# Patient Record
Sex: FEMALE | Race: OTHER | Employment: UNEMPLOYED | URBAN - METROPOLITAN AREA
[De-identification: names, ages, dates, MRNs, and addresses within clinical notes are randomized per-mention and may not be internally consistent; named-entity substitution may affect disease eponyms.]

---

## 2017-01-15 ENCOUNTER — APPOINTMENT (OUTPATIENT)
Dept: GENERAL RADIOLOGY | Age: 47
End: 2017-01-15
Attending: EMERGENCY MEDICINE
Payer: COMMERCIAL

## 2017-01-15 ENCOUNTER — HOSPITAL ENCOUNTER (EMERGENCY)
Age: 47
Discharge: HOME | End: 2017-01-15
Attending: EMERGENCY MEDICINE
Payer: COMMERCIAL

## 2017-01-15 VITALS
SYSTOLIC BLOOD PRESSURE: 140 MMHG | TEMPERATURE: 98.3 F | RESPIRATION RATE: 16 BRPM | HEIGHT: 62 IN | OXYGEN SATURATION: 100 % | HEART RATE: 90 BPM | BODY MASS INDEX: 32.39 KG/M2 | WEIGHT: 176 LBS | DIASTOLIC BLOOD PRESSURE: 87 MMHG

## 2017-01-15 DIAGNOSIS — K29.00 ACUTE GASTRITIS WITHOUT HEMORRHAGE, UNSPECIFIED GASTRITIS TYPE: Primary | ICD-10-CM

## 2017-01-15 LAB
ALBUMIN SERPL BCP-MCNC: 4.1 G/DL (ref 3.5–5)
ALBUMIN/GLOB SERPL: 1.1 {RATIO} (ref 1.2–3.5)
ALP SERPL-CCNC: 99 U/L (ref 50–136)
ALT SERPL-CCNC: 52 U/L (ref 12–65)
ANION GAP BLD CALC-SCNC: 7 MMOL/L (ref 7–16)
AST SERPL W P-5'-P-CCNC: 43 U/L (ref 15–37)
BASOPHILS # BLD AUTO: 0 K/UL (ref 0–0.2)
BASOPHILS # BLD: 0 % (ref 0–2)
BILIRUB SERPL-MCNC: 0.4 MG/DL (ref 0.2–1.1)
BUN SERPL-MCNC: 10 MG/DL (ref 6–23)
CALCIUM SERPL-MCNC: 9 MG/DL (ref 8.3–10.4)
CHLORIDE SERPL-SCNC: 107 MMOL/L (ref 98–107)
CO2 SERPL-SCNC: 28 MMOL/L (ref 21–32)
CREAT SERPL-MCNC: 0.67 MG/DL (ref 0.6–1)
CRP SERPL-MCNC: <0.3 MG/DL (ref 0–0.9)
D DIMER PPP FEU-MCNC: 0.23 UG/ML(FEU)
DIFFERENTIAL METHOD BLD: ABNORMAL
EOSINOPHIL # BLD: 0.1 K/UL (ref 0–0.8)
EOSINOPHIL NFR BLD: 2 % (ref 0.5–7.8)
ERYTHROCYTE [DISTWIDTH] IN BLOOD BY AUTOMATED COUNT: 13.3 % (ref 11.9–14.6)
GLOBULIN SER CALC-MCNC: 3.7 G/DL (ref 2.3–3.5)
GLUCOSE SERPL-MCNC: 81 MG/DL (ref 65–100)
HCT VFR BLD AUTO: 44.9 % (ref 35.8–46.3)
HGB BLD-MCNC: 15.3 G/DL (ref 11.7–15.4)
IMM GRANULOCYTES # BLD: 0 K/UL (ref 0–0.5)
IMM GRANULOCYTES NFR BLD AUTO: 0 % (ref 0–5)
LIPASE SERPL-CCNC: 212 U/L (ref 73–393)
LYMPHOCYTES # BLD AUTO: 46 % (ref 13–44)
LYMPHOCYTES # BLD: 3.1 K/UL (ref 0.5–4.6)
MCH RBC QN AUTO: 30.5 PG (ref 26.1–32.9)
MCHC RBC AUTO-ENTMCNC: 34.1 G/DL (ref 31.4–35)
MCV RBC AUTO: 89.4 FL (ref 79.6–97.8)
MONOCYTES # BLD: 0.4 K/UL (ref 0.1–1.3)
MONOCYTES NFR BLD AUTO: 6 % (ref 4–12)
NEUTS SEG # BLD: 3.2 K/UL (ref 1.7–8.2)
NEUTS SEG NFR BLD AUTO: 46 % (ref 43–78)
PLATELET # BLD AUTO: 207 K/UL (ref 150–450)
PLATELET COMMENTS,PCOM: ADEQUATE
PMV BLD AUTO: 13.6 FL (ref 10.8–14.1)
POTASSIUM SERPL-SCNC: 3.9 MMOL/L (ref 3.5–5.1)
PROT SERPL-MCNC: 7.8 G/DL (ref 6.3–8.2)
RBC # BLD AUTO: 5.02 M/UL (ref 4.05–5.25)
RBC MORPH BLD: ABNORMAL
SODIUM SERPL-SCNC: 142 MMOL/L (ref 136–145)
WBC # BLD AUTO: 6.8 K/UL (ref 4.3–11.1)
WBC MORPH BLD: ABNORMAL

## 2017-01-15 PROCEDURE — 99284 EMERGENCY DEPT VISIT MOD MDM: CPT | Performed by: EMERGENCY MEDICINE

## 2017-01-15 PROCEDURE — 81003 URINALYSIS AUTO W/O SCOPE: CPT | Performed by: EMERGENCY MEDICINE

## 2017-01-15 PROCEDURE — 80053 COMPREHEN METABOLIC PANEL: CPT | Performed by: EMERGENCY MEDICINE

## 2017-01-15 PROCEDURE — 85025 COMPLETE CBC W/AUTO DIFF WBC: CPT | Performed by: EMERGENCY MEDICINE

## 2017-01-15 PROCEDURE — 85379 FIBRIN DEGRADATION QUANT: CPT | Performed by: EMERGENCY MEDICINE

## 2017-01-15 PROCEDURE — 74011250637 HC RX REV CODE- 250/637: Performed by: EMERGENCY MEDICINE

## 2017-01-15 PROCEDURE — 83690 ASSAY OF LIPASE: CPT | Performed by: EMERGENCY MEDICINE

## 2017-01-15 PROCEDURE — 86140 C-REACTIVE PROTEIN: CPT | Performed by: EMERGENCY MEDICINE

## 2017-01-15 PROCEDURE — 74022 RADEX COMPL AQT ABD SERIES: CPT

## 2017-01-15 RX ORDER — TRAMADOL HYDROCHLORIDE 50 MG/1
50 TABLET ORAL
Qty: 20 TAB | Refills: 0 | Status: SHIPPED | OUTPATIENT
Start: 2017-01-15 | End: 2018-03-09 | Stop reason: HOSPADM

## 2017-01-15 RX ORDER — SUCRALFATE 1 G/10ML
1 SUSPENSION ORAL 4 TIMES DAILY
Qty: 420 ML | Refills: 0 | Status: SHIPPED | OUTPATIENT
Start: 2017-01-15 | End: 2018-03-09 | Stop reason: HOSPADM

## 2017-01-15 RX ADMIN — Medication 30 ML: at 15:20

## 2017-01-15 NOTE — ED TRIAGE NOTES
Patient complains of upper abdominal pain that goes around to her back since last night.  Denies n/v.

## 2017-01-15 NOTE — ED PROVIDER NOTES
HPI Comments: Patient presents with complaints of severe epigastric pain radiating straight through to her back. She states the pain is sharp and stabbing in nature currently 7/10 in intensity. No nausea or vomiting associated with the pain and no decreasing factors and then identified however she states the pain seems to get worse whenever she lays supine. She has a history of gastroesophageal reflux disease and states this pain is different than the pain she would have with fat in the past.  She denies any chest pain or shortness of breath review systems otherwise negative    Patient is a 55 y.o. female presenting with abdominal pain. The history is provided by the patient. The history is limited by a language barrier. A  was used. Abdominal Pain    This is a new problem. The current episode started yesterday. The problem occurs constantly. The problem has not changed (waxing and waning) since onset. The pain is associated with an unknown factor. The pain is located in the epigastric region. The quality of the pain is sharp. The pain is at a severity of 7/10. The pain is moderate. Pertinent negatives include no belching, no diarrhea, no flatus, no hematochezia, no melena, no nausea, no vomiting, no constipation, no dysuria, no frequency, no hematuria, no headaches, no arthralgias, no chest pain and no back pain. Nothing worsens the pain. The pain is relieved by nothing. The patient's surgical history includes cholecystectomy.        Past Medical History:   Diagnosis Date    Vocal cord dysfunction        Past Surgical History:   Procedure Laterality Date    Hx cholecystectomy      Hx gyn       tubal ligation    Hx laryngectomy  08/26/15    Bronchoscopy  09/2016     for hemoptysis--negative         Family History:   Problem Relation Age of Onset    Coronary Artery Disease Mother     Coronary Artery Disease Father        Social History     Social History    Marital status:  Spouse name: N/A    Number of children: N/A    Years of education: N/A     Occupational History    Not on file. Social History Main Topics    Smoking status: Never Smoker    Smokeless tobacco: Never Used    Alcohol use 0.0 oz/week     0 Standard drinks or equivalent per week      Comment: occ    Drug use: No    Sexual activity: Not on file     Other Topics Concern    Not on file     Social History Narrative     and lives with . Has lived in [place] and [place]. Has one dog. Previously worked as a .           ALLERGIES: Zithromax [azithromycin]    Review of Systems   Cardiovascular: Negative for chest pain. Gastrointestinal: Positive for abdominal pain. Negative for constipation, diarrhea, flatus, hematochezia, melena, nausea and vomiting. Genitourinary: Negative for dysuria, frequency and hematuria. Musculoskeletal: Negative for arthralgias and back pain. Neurological: Negative for headaches. All other systems reviewed and are negative. Vitals:    01/15/17 1311   BP: (!) 183/110   Pulse: 62   Resp: 16   Temp: 98.3 °F (36.8 °C)   SpO2: 100%   Weight: 79.8 kg (176 lb)   Height: 5' 2\" (1.575 m)            Physical Exam   Constitutional: She is oriented to person, place, and time. She appears well-developed and well-nourished. No distress. HENT:   Head: Normocephalic and atraumatic. Eyes: Conjunctivae and EOM are normal. Pupils are equal, round, and reactive to light. Neck: Normal range of motion. Neck supple. Cardiovascular: Normal rate, regular rhythm, normal heart sounds and intact distal pulses. Pulmonary/Chest: Effort normal and breath sounds normal. She exhibits no tenderness. Abdominal: Soft. Bowel sounds are normal. She exhibits no distension and no mass. There is tenderness. There is no rebound and no guarding.    Mild epigastric tenderness is present without guarding or rigidity or rebound; no bruits or pulsatile masses are noted   Musculoskeletal: Normal range of motion. Neurological: She is alert and oriented to person, place, and time. Skin: Skin is warm and dry. Psychiatric: She has a normal mood and affect. Her behavior is normal.   Nursing note and vitals reviewed. MDM  Number of Diagnoses or Management Options  Diagnosis management comments: Patient is noted the hypertensive at triage with history of hypertension and recheck was improved with blood pressure down to 160 this may be related to pain as she gives no history of hypertension and blood pressure was normal a recent office visit.   However given the uncontrolled hypertension d-dimer will be obtained to evaluate for potential dissection as etiology of her pain although no palpable masses were noted and no bruits were noted on examination and a GI cocktail will be given as a clinical diagnostic trial       Amount and/or Complexity of Data Reviewed  Clinical lab tests: ordered and reviewed  Tests in the radiology section of CPT®: ordered and reviewed  Independent visualization of images, tracings, or specimens: yes    Risk of Complications, Morbidity, and/or Mortality  Presenting problems: high  Diagnostic procedures: moderate  Management options: moderate    Patient Progress  Patient progress: stable    ED Course       Procedures

## 2017-01-15 NOTE — DISCHARGE INSTRUCTIONS
Gastritis: Instrucciones de cuidado - [ Gastritis: Care Instructions ]  Instrucciones de cuidado    La gastritis es dolor y malestar estomacal. Sucede cuando algo irrita el revestimiento del estómago. Hay muchas cosas que pueden causarla. Entre estas se incluyen rosa infección sarbjit la gripe o algo que ha comido o bebido. Los medicamentos o rosa llaga en el recubrimiento del estómago ([place]) también pueden causarla. Puede tener abotagamiento y dolor abdominal. Podría eructar, vomitar y tener revoltura estomacal.  Usted debería poder aliviar el problema tomando medicamentos. Y shabana vez sería útil cambiar la alimentación. Si la gastritis continúa, parrish médico podría recetarle medicamentos. La atención de seguimiento es rosa parte clave de parrish tratamiento y seguridad. Asegúrese de hacer y acudir a todas las citas, y llame a parrish médico si está teniendo problemas. También es roas buena idea saber los resultados de los exámenes y mantener rosa lista de los medicamentos que kimberly. ¿Cómo puede cuidarse en el hogar? · Si parrish médico le recetó antibióticos, tómelos según las indicaciones. No deje de tomarlos por el hecho de sentirse mejor. Debe vangie todos los antibióticos hasta terminarlos. · Sea isaak con los medicamentos. Si parrish médico le recetó un medicamento para reducir el ácido estomacal, tómelo según las indicaciones. Llame a parrish médico si norm estar teniendo problemas con parrish medicamento. · No tome ningún otro medicamento, incluyendo analgésicos (medicamentos para el dolor) de venta amalia, sin consultar con parirsh médico francis. · Si parrish médico le recomienda medicamentos de venta amalia para reducir el ácido estomacal, tales sarbjit Pepcid AC, Prilosec, Tagamet HB o Zantac 75, siga las instrucciones de la etiqueta. · Madelyn abundantes líquidos (los suficientes sarbjit para que parrish orina sea de color amarillo agatha o transparente sarbjit el agua) para prevenir la deshidratación. Elija vangie agua y otros líquidos samantha sin cafeína.  Si tiene [facility] [employer], el corazón o el hígado y tiene que Carmen's líquidos, hable con parrish médico antes de aumentar parrish consumo. · Limite la cantidad de alcohol que sukhdeep. · Evite el café, el té, las bebidas de cola, el chocolate y otros alimentos que contengan cafeína. Aumentan el ácido estomacal.  ¿Cuándo debe pedir ayuda? Llame al 911 en cualquier momento que considere que necesita atención de [place]. Por ejemplo, llame si:  · Vomita judit o algo parecido a granos de café molido. · Angelita heces son de color rojizo o muy sanguinolentas (con judit). Llame a parrish médico ahora mismo o busque atención médica inmediata si:  · Empieza a respirar en forma acelerada y no ha producido [place] en las últimas 8 horas. · No puede retener líquidos en el estómago. Preste especial atención a los cambios en parrish jayne y asegúrese de comunicarse con parrish médico si:  · No mejora sarbjit se esperaba. ¿Dónde puede encontrar más información en inglés? Harjit Arana a http://mackenzie-yessica.info/. Glorya Sacks U291 en la búsqueda para aprender más acerca de \"Gastritis: Instrucciones de cuidado - [ Gastritis: Care Instructions ]. \"  Revisado: 9 agosto, 2016  Versión del contenido: 11.1  © 1165-5143 [employer]wise, [employer]. Las instrucciones de cuidado fueron adaptadas bajo licencia por [employer] (which disclaims liability or warranty for this information). Si usted tiene [place] afección médica o sobre estas instrucciones, siempre pregunte a parrish profesional de jayne. Healthwise, Incorporated niega toda garantía o responsabilidad por parrish uso de esta información.

## 2017-01-15 NOTE — ED NOTES
Pt state that she is feeling better after meds. I have reviewed discharge instructions with the patient. The patient verbalized understanding. Prescriptions given times 2.

## 2017-10-04 ENCOUNTER — HOSPITAL ENCOUNTER (OUTPATIENT)
Dept: ULTRASOUND IMAGING | Age: 47
Discharge: HOME | End: 2017-10-04
Attending: FAMILY MEDICINE
Payer: COMMERCIAL

## 2017-10-04 DIAGNOSIS — N93.8 DUB (DYSFUNCTIONAL UTERINE BLEEDING): ICD-10-CM

## 2017-10-04 PROCEDURE — 76830 TRANSVAGINAL US NON-OB: CPT

## 2017-10-12 ENCOUNTER — HOSPITAL ENCOUNTER (OUTPATIENT)
Dept: MAMMOGRAPHY | Age: 47
Discharge: HOME | End: 2017-10-12
Attending: FAMILY MEDICINE
Payer: MEDICAID

## 2017-10-12 DIAGNOSIS — Z12.31 VISIT FOR SCREENING MAMMOGRAM: ICD-10-CM

## 2017-10-12 PROCEDURE — 77067 SCR MAMMO BI INCL CAD: CPT

## 2018-03-09 PROBLEM — J45.31 MILD PERSISTENT ASTHMA WITH ACUTE EXACERBATION: Status: ACTIVE | Noted: 2018-03-09

## 2018-10-05 PROBLEM — M79.7 FIBROMYALGIA: Status: ACTIVE | Noted: 2018-10-05

## 2019-07-03 PROBLEM — N95.1 HOT FLASHES DUE TO MENOPAUSE: Status: ACTIVE | Noted: 2019-07-03

## 2019-07-03 PROBLEM — G89.29 CHRONIC PELVIC PAIN IN FEMALE: Status: ACTIVE | Noted: 2019-07-03

## 2019-07-03 PROBLEM — N94.10 DYSPAREUNIA IN FEMALE: Status: ACTIVE | Noted: 2019-07-03

## 2019-07-03 PROBLEM — N76.0 BACTERIAL VAGINOSIS: Status: ACTIVE | Noted: 2019-07-03

## 2019-07-03 PROBLEM — B96.89 BACTERIAL VAGINOSIS: Status: ACTIVE | Noted: 2019-07-03

## 2019-07-03 PROBLEM — Z86.018 HISTORY OF UTERINE FIBROID: Status: ACTIVE | Noted: 2019-07-03

## 2019-07-03 PROBLEM — R10.2 CHRONIC PELVIC PAIN IN FEMALE: Status: ACTIVE | Noted: 2019-07-03

## 2019-07-23 ENCOUNTER — HOSPITAL ENCOUNTER (OUTPATIENT)
Dept: MAMMOGRAPHY | Age: 49
Discharge: HOME | End: 2019-07-23
Attending: OBSTETRICS & GYNECOLOGY
Payer: COMMERCIAL

## 2019-07-23 DIAGNOSIS — Z12.31 VISIT FOR SCREENING MAMMOGRAM: ICD-10-CM

## 2019-07-23 PROCEDURE — 77067 SCR MAMMO BI INCL CAD: CPT

## 2019-07-25 PROBLEM — B37.31 VULVOVAGINAL CANDIDIASIS: Status: ACTIVE | Noted: 2019-07-25

## 2020-02-16 ENCOUNTER — HOSPITAL ENCOUNTER (EMERGENCY)
Age: 50
Discharge: HOME | End: 2020-02-16
Attending: EMERGENCY MEDICINE
Payer: COMMERCIAL

## 2020-02-16 VITALS
HEART RATE: 64 BPM | TEMPERATURE: 98.3 F | DIASTOLIC BLOOD PRESSURE: 90 MMHG | OXYGEN SATURATION: 99 % | RESPIRATION RATE: 18 BRPM | SYSTOLIC BLOOD PRESSURE: 157 MMHG

## 2020-02-16 DIAGNOSIS — H00.014 HORDEOLUM EXTERNUM OF LEFT UPPER EYELID: Primary | ICD-10-CM

## 2020-02-16 PROCEDURE — 99282 EMERGENCY DEPT VISIT SF MDM: CPT

## 2020-02-16 NOTE — ED TRIAGE NOTES
Left eye pain and swelling for 2 weeks. Seen by PCP and taking medications with no relief. Dr Marilee Olsen in triage.

## 2020-02-16 NOTE — DISCHARGE INSTRUCTIONS
Follow up with Dr. Rusty Benítez. You will need to call the office to schedule a follow up appointment. Warm compresses to your eye will help. Return to the emergency department as needed.

## 2020-02-16 NOTE — ED NOTES
I have reviewed discharge instructions with the patient. The patient verbalized understanding. Patient left ED via Discharge Method: ambulatory to Home with self. Opportunity for questions and clarification provided. Patient given 0 scripts. To continue your aftercare when you leave the hospital, you may receive an automated call from our care team to check in on how you are doing. This is a free service and part of our promise to provide the best care and service to meet your aftercare needs.  If you have questions, or wish to unsubscribe from this service please call [phone]. Thank you for Choosing our [facility] Emergency Department.

## 2020-02-16 NOTE — ED PROVIDER NOTES
Patient presents with redness and swelling to her left eyelid. She states she has been using gentamycin prescribed by her pcp and warm compresses. She states treatment has not helped. She denies changes to her vision. She denies pain to her actual eye. No redness noted to her eye. The history is provided by the patient. Past Medical History:   Diagnosis Date    GERD (gastroesophageal reflux disease)     Pure hypercholesterolemia 5/3/2016    Vocal cord dysfunction        Past Surgical History:   Procedure Laterality Date    BRONCHOSCOPY  09/2016    for hemoptysis--negative    HX CHOLECYSTECTOMY      HX GYN      tubal ligation    HX LARYNGECTOMY  08/26/15         Family History:   Problem Relation Age of Onset    Coronary Artery Disease Mother     Coronary Artery Disease Father        Social History     Socioeconomic History    Marital status:      Spouse name: Not on file    Number of children: Not on file    Years of education: Not on file    Highest education level: Not on file   Occupational History    Not on file   Social Needs    Financial resource strain: Not on file    Food insecurity:     Worry: Not on file     Inability: Not on file    Transportation needs:     Medical: Not on file     Non-medical: Not on file   Tobacco Use    Smoking status: Never Smoker    Smokeless tobacco: Never Used   Substance and Sexual Activity    Alcohol use:  Yes     Alcohol/week: 0.0 standard drinks     Comment: occ    Drug use: No    Sexual activity: Yes     Partners: Male   Lifestyle    Physical activity:     Days per week: Not on file     Minutes per session: Not on file    Stress: Not on file   Relationships    Social connections:     Talks on phone: Not on file     Gets together: Not on file     Attends Islam service: Not on file     Active member of club or organization: Not on file     Attends meetings of clubs or organizations: Not on file     Relationship status: Not on file  Intimate partner violence:     Fear of current or ex partner: Not on file     Emotionally abused: Not on file     Physically abused: Not on file     Forced sexual activity: Not on file   Other Topics Concern    Not on file   Social History Narrative     and lives with . Has lived in [facility]. Has one dog. Previously worked as a .           ALLERGIES: Zithromax [azithromycin]    Review of Systems   Constitutional: Negative for chills and fever. Eyes: Negative for photophobia, discharge, redness and visual disturbance. Swelling and redness noted to left eyelid. Neurological: Negative for dizziness. Vitals:    02/16/20 1215   BP: 157/90   Pulse: 64   Resp: 18   Temp: 98.3 °F (36.8 °C)   SpO2: 99%            Physical Exam  Vitals signs and nursing note reviewed. Constitutional:       Appearance: Normal appearance. HENT:      Nose: Nose normal.      Mouth/Throat:      Mouth: Mucous membranes are moist.   Eyes:      General:         Right eye: No discharge. Left eye: Hordeolum present. No discharge. Extraocular Movements:      Right eye: No nystagmus. Left eye: No nystagmus. Neck:      Musculoskeletal: Normal range of motion and neck supple. Cardiovascular:      Rate and Rhythm: Normal rate and regular rhythm. Pulses: Normal pulses. Pulmonary:      Effort: Pulmonary effort is normal.      Breath sounds: Normal breath sounds. Abdominal:      General: There is no distension. Tenderness: There is no abdominal tenderness. Skin:     General: Skin is warm and dry. Neurological:      General: No focal deficit present. Mental Status: She is alert and oriented to person, place, and time.    Psychiatric:         Mood and Affect: Mood normal.         Behavior: Behavior normal.          MDM  Number of Diagnoses or Management Options  Hordeolum externum of left upper eyelid:   Diagnosis management comments: Patient referred to ophthalmology    Patient Progress  Patient progress: stable         Procedures

## 2020-11-11 ENCOUNTER — APPOINTMENT (OUTPATIENT)
Dept: GENERAL RADIOLOGY | Age: 50
End: 2020-11-11
Attending: EMERGENCY MEDICINE
Payer: COMMERCIAL

## 2020-11-11 ENCOUNTER — HOSPITAL ENCOUNTER (EMERGENCY)
Age: 50
Discharge: HOME | End: 2020-11-11
Attending: EMERGENCY MEDICINE
Payer: COMMERCIAL

## 2020-11-11 VITALS
RESPIRATION RATE: 16 BRPM | HEIGHT: 62 IN | DIASTOLIC BLOOD PRESSURE: 85 MMHG | TEMPERATURE: 98 F | OXYGEN SATURATION: 99 % | SYSTOLIC BLOOD PRESSURE: 125 MMHG | WEIGHT: 180 LBS | BODY MASS INDEX: 33.13 KG/M2 | HEART RATE: 85 BPM

## 2020-11-11 DIAGNOSIS — M25.511 PAIN IN JOINT OF RIGHT SHOULDER: Primary | ICD-10-CM

## 2020-11-11 PROCEDURE — 99283 EMERGENCY DEPT VISIT LOW MDM: CPT

## 2020-11-11 PROCEDURE — 73030 X-RAY EXAM OF SHOULDER: CPT

## 2020-11-11 PROCEDURE — 96372 THER/PROPH/DIAG INJ SC/IM: CPT

## 2020-11-11 PROCEDURE — 74011250636 HC RX REV CODE- 250/636: Performed by: EMERGENCY MEDICINE

## 2020-11-11 RX ORDER — KETOROLAC TROMETHAMINE 30 MG/ML
15 INJECTION, SOLUTION INTRAMUSCULAR; INTRAVENOUS
Status: COMPLETED | OUTPATIENT
Start: 2020-11-11 | End: 2020-11-11

## 2020-11-11 RX ORDER — MORPHINE SULFATE 10 MG/ML
5 INJECTION, SOLUTION INTRAMUSCULAR; INTRAVENOUS
Status: COMPLETED | OUTPATIENT
Start: 2020-11-11 | End: 2020-11-11

## 2020-11-11 RX ADMIN — MORPHINE SULFATE 5 MG: 10 INJECTION INTRAVENOUS at 19:42

## 2020-11-11 RX ADMIN — KETOROLAC TROMETHAMINE 15 MG: 30 INJECTION, SOLUTION INTRAMUSCULAR at 19:42

## 2020-11-11 NOTE — PROGRESS NOTES
[facility] staff  available over the phone from 3:30 p.m. - midnight. Please call Gail at [phone] with any interpreting requests.       [id number]  Language Services Department  [address]  [address]  [phone] (phone)

## 2020-11-11 NOTE — ED TRIAGE NOTES
Patient with right shoulder rotator cuff repair on 10/24 and tried to return to work yesterday. Patient advises that right shoulder is having some swelling and feels a pulsating pain to area. Mask on during triage. Patient advises pain medication prescribed is not working, norco 5 mg, percocet 5mg. Surgery on [address] [facility].

## 2020-11-12 NOTE — ED PROVIDER NOTES
The history is provided by the patient. Post-Op Problem   This is a new problem. Episode onset: Same symptoms since surgery but more pain in the right shoulder area since going back to work yesterday and having a twisting injury to her upper torso. The problem occurs constantly. The problem has not changed since onset. Pertinent negatives include no shortness of breath. Exacerbated by: Movement of shoulder. Nothing relieves the symptoms. Treatments tried: Pain medication and Tylenol. The treatment provided no relief. Past Medical History:   Diagnosis Date    GERD (gastroesophageal reflux disease)     Pure hypercholesterolemia 5/3/2016    Vocal cord dysfunction        Past Surgical History:   Procedure Laterality Date    BRONCHOSCOPY  09/2016    for hemoptysis--negative    HX CHOLECYSTECTOMY      HX GYN      tubal ligation    HX LARYNGECTOMY  08/26/15    HX ROTATOR CUFF REPAIR Right          Family History:   Problem Relation Age of Onset    Coronary Artery Disease Mother     Coronary Artery Disease Father        Social History     Socioeconomic History    Marital status:      Spouse name: Not on file    Number of children: Not on file    Years of education: Not on file    Highest education level: Not on file   Occupational History    Not on file   Social Needs    Financial resource strain: Not on file    Food insecurity     Worry: Not on file     Inability: Not on file    Transportation needs     Medical: Not on file     Non-medical: Not on file   Tobacco Use    Smoking status: Never Smoker    Smokeless tobacco: Never Used   Substance and Sexual Activity    Alcohol use:  Yes     Alcohol/week: 0.0 standard drinks     Comment: occ    Drug use: No    Sexual activity: Yes     Partners: Male   Lifestyle    Physical activity     Days per week: Not on file     Minutes per session: Not on file    Stress: Not on file   Relationships    Social connections     Talks on phone: Not on file Gets together: Not on file     Attends Baptism service: Not on file     Active member of club or organization: Not on file     Attends meetings of clubs or organizations: Not on file     Relationship status: Not on file    Intimate partner violence     Fear of current or ex partner: Not on file     Emotionally abused: Not on file     Physically abused: Not on file     Forced sexual activity: Not on file   Other Topics Concern    Not on file   Social History Narrative     and lives with . Has lived in [place] and [facility]. Has one dog. Previously worked as a .           ALLERGIES: Zithromax [azithromycin]    Review of Systems   Constitutional: Negative for fever. Respiratory: Negative for cough and shortness of breath. Gastrointestinal: Negative for vomiting. Musculoskeletal: Negative for back pain and neck pain. Skin: Negative for color change. Neurological: Positive for numbness ( Chronic numbness in hands since surgery). Negative for weakness. Vitals:    11/11/20 1820   BP: 124/88   Pulse: (!) 106   Resp: 16   Temp: 98 °F (36.7 °C)   SpO2: 98%   Weight: 81.6 kg (180 lb)   Height: 5' 2\" (1.575 m)            Physical Exam  Vitals signs and nursing note reviewed. Constitutional:       General: She is not in acute distress. Appearance: Normal appearance. She is not ill-appearing. Cardiovascular:      Rate and Rhythm: Normal rate and regular rhythm. Heart sounds: Normal heart sounds. Pulmonary:      Effort: Pulmonary effort is normal.      Breath sounds: Normal breath sounds. Musculoskeletal:      Right shoulder: She exhibits decreased range of motion, tenderness and pain. She exhibits no bony tenderness, no swelling, no effusion, normal pulse and normal strength. Comments: There is mild swelling in the hands which is chronic and unchanged per patient's report. No warmth or erythema is noted in the shoulder area upper arm or forearm.   Upper extremities neurovascularly intact. Skin:     General: Skin is warm and dry. Capillary Refill: Capillary refill takes less than 2 seconds. Neurological:      Mental Status: She is alert. Sensory: No sensory deficit. Motor: No weakness. MDM  Number of Diagnoses or Management Options  Diagnosis management comments: Worsening shoulder pain following a twisting injury at work. No warmth or erythema to suggest septic joint or infection. No chest pain or trouble breathing to suggest PE. There are some mild swelling in the hands but it is unchanged from surgery and I do not appreciate any warmth erythema or significant swelling in the upper arm to suggest DVT. Pain is worse with movement. X-rays unremarkable. We will obtain adequate analgesia here in the emergency department with some IM morphine and Toradol. I will add Motrin to the patient's pain regimen and have her start reicing. She states she has an appointment in mid December but I have instructed her to call her orthopedic doctor tomorrow to be seen before the weekend or early next week at the latest for follow-up and recheck, as I do not believe she should be continuing to require narcotics at this time. Amount and/or Complexity of Data Reviewed  Tests in the radiology section of CPT®: ordered and reviewed (Xr Shoulder Rt Ap/lat Min 2 V    Result Date: 11/11/2020  History: Right rotator cuff repair with right shoulder swelling and pain EXAM: 3 views right shoulder FINDINGS: There is widening of the acromioclavicular interval. The patient may have undergone prior Corina procedure. There is no acute fracture or dislocation. No additional bony abnormality. The included right lung is clear.      IMPRESSION: Postsurgical change, most likely, without definite acute abnormality.    )           Procedures

## 2020-11-12 NOTE — ED NOTES
I have reviewed discharge instructions with the patient. The patient verbalized understanding. Patient left ED via Discharge Method: ambulatory to Home. Opportunity for questions and clarification provided. Patient given 0 scripts. To continue your aftercare when you leave the hospital, you may receive an automated call from our care team to check in on how you are doing. This is a free service and part of our promise to provide the best care and service to meet your aftercare needs.  If you have questions, or wish to unsubscribe from this service please call [phone]. Thank you for Choosing our [facility] Emergency Department.

## 2020-11-12 NOTE — DISCHARGE INSTRUCTIONS
Patient Education   Motrin 400 mg every 6 hours. Continue your pain medicine every 4-6 hours if needed for breakthrough pain. Ice to the area for 15 to 20 minutes every 4 hours while awake for 3 to 5 days then change to heat for a few days. Call your orthopedic surgeon tomorrow morning for follow-up appointment this week or early to mid next week at the latest.  Return if fever, chest pain or trouble breathing. Dolor musculoesquelético: Instrucciones de cuidado  [Musculoskeletal Pain: Care Instructions]  Instrucciones de cuidado  Diferentes problemas con los [place], los músculos, los nervios, los ligamentos y los tendones del cuerpo pueden provocar dolor. Es posible que le duelan o ardan rosa o más zonas del cuerpo. O pueden sentirse cansadas o rígidas. El término médico para nick tipo de dolor es dolor musculoesquelético. Puede tener muchas causas diferentes. A veces, el dolor está causado por rosa lesión sarbjit rosa distensión o un esguince. O usted puede tener dolor por mary kate usado rosa parte de parrish cuerpo de la misma manera rosa y [place] se llama uso excesivo. En algunos casos, la causa del dolor es otro problema sarbjit la artritis o la fibromialgia. El médico lo Adkins Slim y le hará preguntas acerca de parrish jayne para ayudar a determinar la causa del dolor. Los análisis de [address] o las pruebas por imágenes, sarbjit rosa radiografía (frederic X), también pueden ser [place]. Sin embargo, los médicos a veces no encuentran la causa del dolor. El tratamiento depende de naun síntomas y de la causa del dolor, si es que se conoce. El médico lo aceves examinado minuciosamente, ori pueden presentarse problemas más tarde. Si nota algún problema o nuevos síntomas, busque tratamiento médico de inmediato. La atención de seguimiento es rosa parte clave de parrish tratamiento y seguridad. Asegúrese de hacer y acudir a todas las citas, y llame a parrish médico si está teniendo problemas.  También es rosa buena idea saber los [place] de naun exámenes y [employer] lista de los medicamentos que kimberly. ¿Cómo puede cuidarse en el hogar? · Descanse hasta que se sienta mejor. · No hai nada que empeore el dolor. Retome el ejercicio gradualmente si se siente mejor y parrish médico dice que [address] [facility]. · Sea isaak con los medicamentos. Lovely y siga todas las instrucciones de la [place]. ¨ Si el médico le recetó un analgésico (medicamento para el dolor), tómelo según las indicaciones. ¨ Si no está tomando un analgésico recetado, pregúntele a parrish médico si puede vangie derrick de [employer]. · Colóquese hielo o rosa compresa fría en la cindy por entre 10 y 21 minutos cada vez para aliviar el dolor. Póngase un paño alicia entre el hielo y la piel. ¿Cuándo debe pedir ayuda? Llame a parrish médico ahora mismo o busque atención médica inmediata si:  · Siente un dolor nuevo o el dolor empeora. · Tiene nuevos síntomas, sarbjit fiebre, escalofríos o un salpullido. Preste especial atención a los cambios en parrish jayne y asegúrese de comunicarse con parrish médico si:  · No mejora sarbjit se esperaba. ¿Dónde puede encontrar más información en inglés? Vaya a DealExplorer.be  Jeffrey D388 en la búsqueda para aprender más acerca de \"Dolor musculoesquelético: Instrucciones de cuidado. \"   © 9782-5235 Healthwise, Incorporated. Instrucciones de cuidado adaptadas bajo licencia por [facility] (which disclaims liability or warranty for this information). Estas instrucciones de cuidado son para usarlas con parrish profesional clínico registrado. Si tiene preguntas acerca de rosa afección médica o de estas instrucciones, pregunte siempre a parrish profesional de [employer]. Healthwise, Incorporated niega cualquier garantía o responsabilidad por parrish uso de esta información.   Versión del contenido: [id number]; Revisado: 20 noviembre, 2015

## 2020-11-13 ENCOUNTER — HOSPITAL ENCOUNTER (EMERGENCY)
Age: 50
Discharge: HOME | End: 2020-11-13
Attending: EMERGENCY MEDICINE
Payer: COMMERCIAL

## 2020-11-13 ENCOUNTER — APPOINTMENT (OUTPATIENT)
Dept: GENERAL RADIOLOGY | Age: 50
End: 2020-11-13
Attending: NURSE PRACTITIONER
Payer: COMMERCIAL

## 2020-11-13 VITALS
BODY MASS INDEX: 33.13 KG/M2 | SYSTOLIC BLOOD PRESSURE: 139 MMHG | RESPIRATION RATE: 14 BRPM | HEART RATE: 80 BPM | HEIGHT: 62 IN | DIASTOLIC BLOOD PRESSURE: 95 MMHG | TEMPERATURE: 98.5 F | OXYGEN SATURATION: 97 % | WEIGHT: 180 LBS

## 2020-11-13 DIAGNOSIS — M25.512 ACUTE PAIN OF LEFT SHOULDER: ICD-10-CM

## 2020-11-13 DIAGNOSIS — M54.42 ACUTE LEFT-SIDED LOW BACK PAIN WITH LEFT-SIDED SCIATICA: Primary | ICD-10-CM
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
## 2020-11-13 PROCEDURE — 99283 EMERGENCY DEPT VISIT LOW MDM: CPT

## 2020-11-13 PROCEDURE — 72100 X-RAY EXAM L-S SPINE 2/3 VWS: CPT

## 2020-11-13 PROCEDURE — 72040 X-RAY EXAM NECK SPINE 2-3 VW: CPT

## 2020-11-13 PROCEDURE — 74011250637 HC RX REV CODE- 250/637: Performed by: NURSE PRACTITIONER

## 2020-11-13 PROCEDURE — 73030 X-RAY EXAM OF SHOULDER: CPT

## 2020-11-13 RX ORDER — LIDOCAINE 4 G/100G
PATCH TOPICAL
Qty: 11 PATCH | Refills: 0 | Status: SHIPPED | OUTPATIENT
Start: 2020-11-13 | End: 2021-01-18

## 2020-11-13 RX ORDER — CYCLOBENZAPRINE HCL 10 MG
10 TABLET ORAL
Status: COMPLETED | OUTPATIENT
Start: 2020-11-13 | End: 2020-11-13

## 2020-11-13 RX ORDER — PREDNISONE 20 MG/1
TABLET ORAL
Qty: 11 TAB | Refills: 0 | Status: SHIPPED | OUTPATIENT
Start: 2020-11-13 | End: 2021-02-25

## 2020-11-13 RX ORDER — CYCLOBENZAPRINE HCL 5 MG
5 TABLET ORAL
Qty: 30 TAB | Refills: 0 | Status: SHIPPED | OUTPATIENT
Start: 2020-11-13 | End: 2021-01-18

## 2020-11-13 RX ORDER — DICLOFENAC SODIUM 10 MG/G
2 GEL TOPICAL 4 TIMES DAILY
Qty: 1 EACH | Refills: 0 | Status: SHIPPED | OUTPATIENT
Start: 2020-11-13 | End: 2020-11-20

## 2020-11-13 RX ADMIN — CYCLOBENZAPRINE 10 MG: 10 TABLET, FILM COATED ORAL at 14:32

## 2020-11-13 NOTE — ED NOTES
I have reviewed discharge instructions with the patient. The patient verbalized understanding. Patient left ED via Discharge Method: ambulatory to Home with     Opportunity for questions and clarification provided. Patient given 4 scripts. To continue your aftercare when you leave the hospital, you may receive an automated call from our care team to check in on how you are doing. This is a free service and part of our promise to provide the best care and service to meet your aftercare needs.  If you have questions, or wish to unsubscribe from this service please call [phone]. Thank you for Choosing our [address] [place] [address] Emergency Department.

## 2020-11-13 NOTE — LETTER
[address] [address] EMERGENCY DEPT 
[address] [facility] [address] 
[phone] Work/School Note Date: 11/13/2020 To Whom It May concern: 
 
Jermaine Elkins was seen and treated today in the emergency room by the following provider(s): 
Attending Provider: Anoop Becerril MD 
Nurse Practitioner: Kade Noguera NP. Faraz Rodriguez  may return to work on tomorrow. Sincerely, Crow Wakefield NP

## 2020-11-13 NOTE — ED TRIAGE NOTES
Pt speaks Tunisian, interpretor needed. Pt states she tripped and fell at work yesterday. Now has upper back pain, left hip pain, and left arm pain.

## 2020-11-13 NOTE — LETTER
[address] [address] EMERGENCY DEPT 
[address] [facility] [address] 
[phone] Work/School Note Date: 11/13/2020 To Whom It May concern: 
 
Poncho Munroe was seen and treated today in the emergency room by the following provider(s): 
Attending Provider: Paige Noyola MD 
Nurse Practitioner: Linsey Espinoza NP. Poncho Munroe may return to work on 11/15/2020. Sincerely, Sharon Glasgow NP

## 2020-11-13 NOTE — ROUTINE PROCESS
present over-the-phone during triage assessment with Rigo Porras RN. [facility] [address]  [facility] Food Language Services Department 
[facility]  [facility] 
[phone] (phone)

## 2020-11-13 NOTE — ED PROVIDER NOTES
With assist of hospital  on phone pt tells me she recently had right upper extremity surgery about  6 weeks ago. She was back at work yesterday and took a fall. She was protecting her right upper extremity in the fall and ended twisting her body, landing on the left shoulder and left hip. She suffered pain to the left shoulder and left hip, left lower back and now has pain to same with pain radiating down left leg. No diff with void. Right arm is fine she reports. Maintains good sensation to all extremities. No diff with void or bm. Didn't strike head and no loc. No neck pain. Past Medical History:   Diagnosis Date    GERD (gastroesophageal reflux disease)     Pure hypercholesterolemia 5/3/2016    Vocal cord dysfunction        Past Surgical History:   Procedure Laterality Date    BRONCHOSCOPY  09/2016    for hemoptysis--negative    HX CHOLECYSTECTOMY      HX GYN      tubal ligation    HX LARYNGECTOMY  08/26/15    HX ROTATOR CUFF REPAIR Right          Family History:   Problem Relation Age of Onset    Coronary Artery Disease Mother     Coronary Artery Disease Father        Social History     Socioeconomic History    Marital status:      Spouse name: Not on file    Number of children: Not on file    Years of education: Not on file    Highest education level: Not on file   Occupational History    Not on file   Social Needs    Financial resource strain: Not on file    Food insecurity     Worry: Not on file     Inability: Not on file    Transportation needs     Medical: Not on file     Non-medical: Not on file   Tobacco Use    Smoking status: Never Smoker    Smokeless tobacco: Never Used   Substance and Sexual Activity    Alcohol use:  Yes     Alcohol/week: 0.0 standard drinks     Comment: occ    Drug use: No    Sexual activity: Yes     Partners: Male   Lifestyle    Physical activity     Days per week: Not on file     Minutes per session: Not on file    Stress: Not on file   Relationships    Social connections     Talks on phone: Not on file     Gets together: Not on file     Attends Hoahaoism service: Not on file     Active member of club or organization: Not on file     Attends meetings of clubs or organizations: Not on file     Relationship status: Not on file    Intimate partner violence     Fear of current or ex partner: Not on file     Emotionally abused: Not on file     Physically abused: Not on file     Forced sexual activity: Not on file   Other Topics Concern    Not on file   Social History Narrative     and lives with . Has lived in [place] and [place]. Has one dog. Previously worked as a .           ALLERGIES: Zithromax [azithromycin]    Review of Systems   Constitutional: Negative for chills and fever. HENT: Negative for facial swelling and mouth sores. Eyes: Negative for discharge and redness. Respiratory: Negative for cough and shortness of breath. Cardiovascular: Negative for chest pain and palpitations. Gastrointestinal: Negative for nausea and vomiting. Genitourinary: Negative for difficulty urinating. Musculoskeletal: Positive for back pain and myalgias. Skin: Negative for rash and wound. Neurological: Negative for weakness and numbness. Psychiatric/Behavioral: Negative for confusion and decreased concentration. Vitals:    11/13/20 1322 11/13/20 1325   BP:  (!) 139/95   Pulse:  80   Resp:  14   Temp:  98.5 °F (36.9 °C)   SpO2:  97%   Weight: 81.6 kg (180 lb)    Height: 5' 2\" (1.575 m)             Physical Exam  Vitals signs and nursing note reviewed. Constitutional:       Appearance: Normal appearance. HENT:      Head: Normocephalic and atraumatic. Nose: Nose normal.      Mouth/Throat:      Mouth: Mucous membranes are moist.   Eyes:      Extraocular Movements: Extraocular movements intact. Pupils: Pupils are equal, round, and reactive to light.    Neck:      Musculoskeletal: Normal range of motion and neck supple. Cardiovascular:      Rate and Rhythm: Normal rate and regular rhythm. Pulmonary:      Effort: Pulmonary effort is normal.      Breath sounds: Normal breath sounds. Musculoskeletal:         General: Tenderness present. Arms:         Legs:    Skin:     General: Skin is warm and dry. Neurological:      General: No focal deficit present. Mental Status: She is alert and oriented to person, place, and time. Psychiatric:         Mood and Affect: Mood normal.         Behavior: Behavior normal.         Thought Content: Thought content normal.         Judgment: Judgment normal.          MDM  Number of Diagnoses or Management Options  Diagnosis management comments: Will xray what hurts,, she denies possibility of preg due to remote tubal ligation    3:19 PM  Diagnostics neg   Will dc with left sided low back pain with sciatica.   Left shoulder pain, to follow with ortho       Amount and/or Complexity of Data Reviewed  Tests in the radiology section of CPT®: ordered and reviewed    Risk of Complications, Morbidity, and/or Mortality  Presenting problems: minimal  Diagnostic procedures: minimal  Management options: minimal    Patient Progress  Patient progress: improved         Procedures

## 2020-11-13 NOTE — ROUTINE PROCESS
present over-the-phone for assessment with Kyle David NP. 
 
Valentín Yeung [facility] [address]  [place] Food Language Services Department 
[facility] [address]  [facility] 
[phone] (phone)

## 2020-11-13 NOTE — DISCHARGE INSTRUCTIONS
Patient Education        Learning About RICE (Rest, Ice, Compression, and Elevation)  What is RICE? RICE is a way to care for an injury. RICE helps relieve pain and swelling. It may also help with healing and flexibility. RICE stands for:  · R est and protect the injured or sore area. · I ce or a cold pack used as soon as possible. · C ompression, or wrapping the injured or sore area with an elastic bandage. · E levation (propping up) the injured or sore area. How do you do RICE? You can use RICE for home treatment when you have general aches and pains or after an injury or surgery. Rest  · Do not put weight on the injury for at least 24 to 48 hours. · Use crutches for a badly sprained knee or ankle. · Support a sprained wrist, elbow, or shoulder with a sling. Ice  · Put ice or a cold pack on the injury right away to reduce pain and swelling. Frozen vegetables will also work as an ice pack. Put a thin cloth between the ice or cold pack and your skin. The cloth protects the injured area from getting too cold. · Use ice for 10 to 15 minutes at a time for the first 48 to 72 hours. Compression  · Use compression for sprains, strains, and surgeries of the arms and legs. · Wrap the injured area with an elastic bandage or compression sleeve to reduce swelling. · Don't wrap it too tightly. If the area below it feels numb, tingles, or feels cool, loosen the wrap. Elevation  · Use elevation for areas of the body that can be propped up, such as arms and legs. · Prop up the injured area on pillows whenever you use ice. Keep it propped up anytime you sit or lie down. · Try to keep the injured area at or above the level of your heart. This will help reduce swelling and bruising. Where can you learn more? Go to http://www.gray.com/  Enter I463 in the search box to learn more about \"Learning About RICE (Rest, Ice, Compression, and Elevation). \"  Current as of: March 2, 2020               Content Version: 12.6  © 3978-1762 [employer], Incorporated. Care instructions adapted under license by [employer] (which disclaims liability or warranty for this information). If you have questions about a medical condition or this instruction, always ask your healthcare professional. [address] any warranty or liability for your use of this information. home with family   Use rest, ice, and meds as provided to ease pain. Follow with [facility] to continue care.   Work note

## 2021-05-02 ENCOUNTER — APPOINTMENT (OUTPATIENT)
Dept: ULTRASOUND IMAGING | Age: 51
End: 2021-05-02
Attending: PHYSICIAN ASSISTANT
Payer: COMMERCIAL

## 2021-05-02 ENCOUNTER — HOSPITAL ENCOUNTER (EMERGENCY)
Age: 51
Discharge: HOME | End: 2021-05-02
Attending: EMERGENCY MEDICINE
Payer: COMMERCIAL

## 2021-05-02 VITALS
WEIGHT: 190 LBS | OXYGEN SATURATION: 100 % | HEART RATE: 70 BPM | BODY MASS INDEX: 34.96 KG/M2 | DIASTOLIC BLOOD PRESSURE: 66 MMHG | RESPIRATION RATE: 16 BRPM | TEMPERATURE: 98.6 F | SYSTOLIC BLOOD PRESSURE: 118 MMHG | HEIGHT: 62 IN

## 2021-05-02 DIAGNOSIS — D21.9 FIBROIDS: ICD-10-CM

## 2021-05-02 DIAGNOSIS — N93.9 VAGINAL BLEEDING: Primary | ICD-10-CM

## 2021-05-02 LAB
ALBUMIN SERPL-MCNC: 3.7 G/DL (ref 3.5–5)
ALBUMIN/GLOB SERPL: 1 {RATIO} (ref 1.2–3.5)
ALP SERPL-CCNC: 81 U/L (ref 50–136)
ALT SERPL-CCNC: 39 U/L (ref 12–65)
ANION GAP SERPL CALC-SCNC: 6 MMOL/L (ref 7–16)
AST SERPL-CCNC: 46 U/L (ref 15–37)
BASOPHILS # BLD: 0 K/UL (ref 0–0.2)
BASOPHILS NFR BLD: 1 % (ref 0–2)
BILIRUB SERPL-MCNC: 0.3 MG/DL (ref 0.2–1.1)
BUN SERPL-MCNC: 11 MG/DL (ref 6–23)
CALCIUM SERPL-MCNC: 9.3 MG/DL (ref 8.3–10.4)
CHLORIDE SERPL-SCNC: 109 MMOL/L (ref 98–107)
CO2 SERPL-SCNC: 24 MMOL/L (ref 21–32)
CREAT SERPL-MCNC: 0.58 MG/DL (ref 0.6–1)
DIFFERENTIAL METHOD BLD: ABNORMAL
EOSINOPHIL # BLD: 0.3 K/UL (ref 0–0.8)
EOSINOPHIL NFR BLD: 3 % (ref 0.5–7.8)
ERYTHROCYTE [DISTWIDTH] IN BLOOD BY AUTOMATED COUNT: 13.1 % (ref 11.9–14.6)
GLOBULIN SER CALC-MCNC: 3.8 G/DL (ref 2.3–3.5)
GLUCOSE SERPL-MCNC: 105 MG/DL (ref 65–100)
HCT VFR BLD AUTO: 45.1 % (ref 35.8–46.3)
HGB BLD-MCNC: 14.8 G/DL (ref 11.7–15.4)
IMM GRANULOCYTES # BLD AUTO: 0 K/UL (ref 0–0.5)
IMM GRANULOCYTES NFR BLD AUTO: 0 % (ref 0–5)
LYMPHOCYTES # BLD: 3.9 K/UL (ref 0.5–4.6)
LYMPHOCYTES NFR BLD: 48 % (ref 13–44)
MCH RBC QN AUTO: 30 PG (ref 26.1–32.9)
MCHC RBC AUTO-ENTMCNC: 32.8 G/DL (ref 31.4–35)
MCV RBC AUTO: 91.5 FL (ref 79.6–97.8)
MONOCYTES # BLD: 0.6 K/UL (ref 0.1–1.3)
MONOCYTES NFR BLD: 7 % (ref 4–12)
NEUTS SEG # BLD: 3.4 K/UL (ref 1.7–8.2)
NEUTS SEG NFR BLD: 42 % (ref 43–78)
NRBC # BLD: 0 K/UL (ref 0–0.2)
PLATELET # BLD AUTO: 211 K/UL (ref 150–450)
PMV BLD AUTO: 12.7 FL (ref 9.4–12.3)
POTASSIUM SERPL-SCNC: 4.8 MMOL/L (ref 3.5–5.1)
PROT SERPL-MCNC: 7.5 G/DL (ref 6.3–8.2)
RBC # BLD AUTO: 4.93 M/UL (ref 4.05–5.2)
SODIUM SERPL-SCNC: 139 MMOL/L (ref 136–145)
WBC # BLD AUTO: 8.2 K/UL (ref 4.3–11.1)

## 2021-05-02 PROCEDURE — 85025 COMPLETE CBC W/AUTO DIFF WBC: CPT

## 2021-05-02 PROCEDURE — 81003 URINALYSIS AUTO W/O SCOPE: CPT

## 2021-05-02 PROCEDURE — 76856 US EXAM PELVIC COMPLETE: CPT

## 2021-05-02 PROCEDURE — 99283 EMERGENCY DEPT VISIT LOW MDM: CPT

## 2021-05-02 PROCEDURE — 80053 COMPREHEN METABOLIC PANEL: CPT

## 2021-05-02 NOTE — ED PROVIDER NOTES
Patient is a 70-year-old female who comes in with concern of vaginal bleeding which has been coming and going over the past 2 weeks. Patient reports that she has not had her period in over 3 years and she started having light vaginal spotting about 2 weeks ago. She says at times she soaks through an entire pad and has to change the pad a few times a day. She denies abdominal pain but does report that she has had some moderate lower pelvic midline cramping and pain that has been coming and going. She denies fevers, chills or sweats. She denies dysuria, hematuria or urinary frequency. Past Medical History:   Diagnosis Date    GERD (gastroesophageal reflux disease)     Pure hypercholesterolemia 5/3/2016    Vocal cord dysfunction        Past Surgical History:   Procedure Laterality Date    BRONCHOSCOPY  09/2016    for hemoptysis--negative    HX CHOLECYSTECTOMY      HX GYN      tubal ligation    HX LARYNGECTOMY  08/26/15    HX ROTATOR CUFF REPAIR Right          Family History:   Problem Relation Age of Onset    Coronary Artery Disease Mother     Coronary Artery Disease Father        Social History     Socioeconomic History    Marital status:      Spouse name: Not on file    Number of children: Not on file    Years of education: Not on file    Highest education level: Not on file   Occupational History    Not on file   Social Needs    Financial resource strain: Not on file    Food insecurity     Worry: Not on file     Inability: Not on file    Transportation needs     Medical: Not on file     Non-medical: Not on file   Tobacco Use    Smoking status: Never Smoker    Smokeless tobacco: Never Used   Substance and Sexual Activity    Alcohol use:  Yes     Alcohol/week: 0.0 standard drinks     Comment: occ    Drug use: No    Sexual activity: Yes     Partners: Male   Lifestyle    Physical activity     Days per week: Not on file     Minutes per session: Not on file    Stress: Not on file   Relationships    Social connections     Talks on phone: Not on file     Gets together: Not on file     Attends Anabaptism service: Not on file     Active member of club or organization: Not on file     Attends meetings of clubs or organizations: Not on file     Relationship status: Not on file    Intimate partner violence     Fear of current or ex partner: Not on file     Emotionally abused: Not on file     Physically abused: Not on file     Forced sexual activity: Not on file   Other Topics Concern    Not on file   Social History Narrative     and lives with . Has lived in [place] and [place]. Has one dog. Previously worked as a .           ALLERGIES: Zithromax [azithromycin]    Review of Systems   Constitutional: Negative for chills and fever. Respiratory: Negative for cough and shortness of breath. Cardiovascular: Negative for chest pain. Gastrointestinal: Negative for abdominal pain, nausea and vomiting. Genitourinary: Positive for pelvic pain and vaginal bleeding. Negative for decreased urine volume, dysuria, hematuria and urgency. Skin: Negative for color change. All other systems reviewed and are negative. Vitals:    05/02/21 1621 05/02/21 1742   BP: (!) 151/98 114/89   Pulse: 71 68   Resp: 16 16   Temp: 98.1 °F (36.7 °C)    SpO2: 99% 100%   Weight: 86.2 kg (190 lb)    Height: 5' 2\" (1.575 m)             Physical Exam  Vitals signs and nursing note reviewed. Constitutional:       General: She is not in acute distress. Appearance: Normal appearance. She is not ill-appearing, toxic-appearing or diaphoretic. HENT:      Head: Normocephalic and atraumatic. Eyes:      Conjunctiva/sclera: Conjunctivae normal.   Cardiovascular:      Rate and Rhythm: Normal rate and regular rhythm. Pulses: Normal pulses. Pulmonary:      Effort: Pulmonary effort is normal. No respiratory distress. Breath sounds: Normal air entry.  No stridor, decreased air movement or transmitted upper airway sounds. No decreased breath sounds. Abdominal:      General: Abdomen is flat. Palpations: Abdomen is soft. Tenderness: There is no abdominal tenderness. There is no right CVA tenderness, left CVA tenderness or guarding. Skin:     General: Skin is warm and dry. Neurological:      General: No focal deficit present. Mental Status: She is alert and oriented to person, place, and time. Mental status is at baseline. MDM  Number of Diagnoses or Management Options  Fibroids: new and requires workup  Vaginal bleeding: new and requires workup  Diagnosis management comments: This patient is a 59-year-old female coming in with vaginal bleeding that has been coming and going for about 2 weeks. Labs were obtained and were unremarkable including normal H&H. Pelvic ultrasound was also obtained which showed uterine fibroids but no other acute process. Given the symptoms, patient needs to follow-up ASAP with her OB/GYN. She denies history of abnormal Pap smear. on-call OB/GYN was notified and recommended calling the office for an appointment tomorrow morning as she will need follow-up ASAP. Patient verbalized understanding and agrees to the plan. Patient inquired about continuing her medroxyprogesterone and estradiol which were prescribed by her OB/GYN. She was advised that she may continue this medication until she sees her OB/GYN but it is vital for her to follow-up in the next few days.        Amount and/or Complexity of Data Reviewed  Clinical lab tests: reviewed and ordered  Tests in the radiology section of CPT®: reviewed and ordered  Tests in the medicine section of CPT®: reviewed and ordered  Review and summarize past medical records: yes    Risk of Complications, Morbidity, and/or Mortality  Presenting problems: moderate  Diagnostic procedures: low  Management options: low    Patient Progress  Patient progress: stable    ED Course as of May 02 2325   Sun May 02, 2021 2040 Spoke with the on-call OB/GYN regarding the patient's case. The patient will need to follow-up ASAP with the OB/GYN.   She may continue taking her estradiol and medroxyprogesterone until she follows up with her OB/GYN but she needs to follow-up ASAP    [AR]      ED Course User Index  [AR] THOMAS Givens       Procedures

## 2021-05-02 NOTE — ED TRIAGE NOTES
Pt ambulatory to triage. Pt states she has not had a period in 2 years and states she started having scant bleeding and made appt with doctor and that day began having a lot of bleeding since then. Pt states she has pressure in pelvic area.  Denies fever, n/v/d.

## 2021-05-03 NOTE — DISCHARGE INSTRUCTIONS
You need to follow-up ASAP with your OB/GYN. Please call the office tomorrow morning to schedule an appointment ASAP. Debe hacer un seguimiento lo antes posible con parrish obstetra / ginecólogo. Llame a la oficina mañana por la mañana para programar rosa que lo antes posible.

## 2021-05-03 NOTE — ED NOTES
I have reviewed discharge instructions with the patient. The patient verbalized understanding. Patient left ED via Discharge Method: ambulatory to Home . Opportunity for questions and clarification provided. Patient given 0 scripts. To continue your aftercare when you leave the hospital, you may receive an automated call from our care team to check in on how you are doing. This is a free service and part of our promise to provide the best care and service to meet your aftercare needs.  If you have questions, or wish to unsubscribe from this service please call [phone]. Thank you for Choosing our [address] [place] [address] Emergency Department.

## 2021-06-16 NOTE — PROGRESS NOTES
Patient agrees to arrival at hospital at 0830 for a 1000 procedure. No concerns noted at this time.  will be present.

## 2021-06-17 ENCOUNTER — HOSPITAL ENCOUNTER (OUTPATIENT)
Age: 51
Setting detail: OUTPATIENT SURGERY
Discharge: HOME | End: 2021-06-17
Attending: SURGERY | Admitting: SURGERY
Payer: COMMERCIAL

## 2021-06-17 ENCOUNTER — ANESTHESIA EVENT (OUTPATIENT)
Dept: ENDOSCOPY | Age: 51
End: 2021-06-17
Payer: COMMERCIAL

## 2021-06-17 ENCOUNTER — ANESTHESIA (OUTPATIENT)
Dept: ENDOSCOPY | Age: 51
End: 2021-06-17
Payer: COMMERCIAL

## 2021-06-17 VITALS
RESPIRATION RATE: 12 BRPM | DIASTOLIC BLOOD PRESSURE: 71 MMHG | HEART RATE: 54 BPM | SYSTOLIC BLOOD PRESSURE: 141 MMHG | OXYGEN SATURATION: 100 % | TEMPERATURE: 97.7 F

## 2021-06-17 DIAGNOSIS — Z12.11 SCREEN FOR COLON CANCER: ICD-10-CM

## 2021-06-17 DIAGNOSIS — K63.5 POLYP OF ASCENDING COLON, UNSPECIFIED TYPE: ICD-10-CM

## 2021-06-17 PROCEDURE — 45384 COLONOSCOPY W/LESION REMOVAL: CPT | Performed by: SURGERY

## 2021-06-17 PROCEDURE — 74011000250 HC RX REV CODE- 250: Performed by: NURSE ANESTHETIST, CERTIFIED REGISTERED

## 2021-06-17 PROCEDURE — 74011250636 HC RX REV CODE- 250/636: Performed by: SURGERY

## 2021-06-17 PROCEDURE — 2709999900 HC NON-CHARGEABLE SUPPLY: Performed by: SURGERY

## 2021-06-17 PROCEDURE — 74011250636 HC RX REV CODE- 250/636: Performed by: NURSE ANESTHETIST, CERTIFIED REGISTERED

## 2021-06-17 PROCEDURE — 88305 TISSUE EXAM BY PATHOLOGIST: CPT

## 2021-06-17 PROCEDURE — 76040000025: Performed by: SURGERY

## 2021-06-17 PROCEDURE — 76060000032 HC ANESTHESIA 0.5 TO 1 HR: Performed by: SURGERY

## 2021-06-17 PROCEDURE — 77030009426 HC FCPS BIOP ENDOSC BSC -B: Performed by: SURGERY

## 2021-06-17 RX ORDER — PROPOFOL 10 MG/ML
INJECTION, EMULSION INTRAVENOUS
Status: DISCONTINUED | OUTPATIENT
Start: 2021-06-17 | End: 2021-06-17 | Stop reason: HOSPADM

## 2021-06-17 RX ORDER — PROPOFOL 10 MG/ML
INJECTION, EMULSION INTRAVENOUS AS NEEDED
Status: DISCONTINUED | OUTPATIENT
Start: 2021-06-17 | End: 2021-06-17 | Stop reason: HOSPADM

## 2021-06-17 RX ORDER — LIDOCAINE HYDROCHLORIDE 20 MG/ML
INJECTION, SOLUTION EPIDURAL; INFILTRATION; INTRACAUDAL; PERINEURAL AS NEEDED
Status: DISCONTINUED | OUTPATIENT
Start: 2021-06-17 | End: 2021-06-17 | Stop reason: HOSPADM

## 2021-06-17 RX ORDER — SODIUM CHLORIDE, SODIUM LACTATE, POTASSIUM CHLORIDE, CALCIUM CHLORIDE 600; 310; 30; 20 MG/100ML; MG/100ML; MG/100ML; MG/100ML
1000 INJECTION, SOLUTION INTRAVENOUS CONTINUOUS
Status: DISCONTINUED | OUTPATIENT
Start: 2021-06-17 | End: 2021-06-17 | Stop reason: HOSPADM

## 2021-06-17 RX ADMIN — PROPOFOL 60 MG: 10 INJECTION, EMULSION INTRAVENOUS at 09:59

## 2021-06-17 RX ADMIN — PROPOFOL 200 MCG/KG/MIN: 10 INJECTION, EMULSION INTRAVENOUS at 09:59

## 2021-06-17 RX ADMIN — LIDOCAINE HYDROCHLORIDE 100 MG: 20 INJECTION, SOLUTION EPIDURAL; INFILTRATION; INTRACAUDAL; PERINEURAL at 09:59

## 2021-06-17 RX ADMIN — SODIUM CHLORIDE, SODIUM LACTATE, POTASSIUM CHLORIDE, AND CALCIUM CHLORIDE 1000 ML: 600; 310; 30; 20 INJECTION, SOLUTION INTRAVENOUS at 09:20

## 2021-06-17 NOTE — PROGRESS NOTES
Patient requires a [address] [facility] . [employer]. Patient has a hx of tubal ligation. Pregnancy refusal signed and placed in chart.

## 2021-06-17 NOTE — ANESTHESIA PREPROCEDURE EVALUATION
Relevant Problems   No relevant active problems       Anesthetic History     PONV          Review of Systems / Medical History  Patient summary reviewed and pertinent labs reviewed    Pulmonary            Asthma : well controlled       Neuro/Psych   Within defined limits           Cardiovascular              Hyperlipidemia    Exercise tolerance: >4 METS     GI/Hepatic/Renal     GERD: well controlled           Endo/Other             Other Findings              Physical Exam    Airway  Mallampati: II  TM Distance: 4 - 6 cm  Neck ROM: normal range of motion   Mouth opening: Normal     Cardiovascular    Rhythm: regular  Rate: normal         Dental  No notable dental hx       Pulmonary  Breath sounds clear to auscultation               Abdominal         Other Findings            Anesthetic Plan    ASA: 2            Induction: Intravenous  Anesthetic plan and risks discussed with: Patient

## 2021-06-17 NOTE — H&P
[email or web address], [address] [facility]  [phone]    H&P Note   Eric Dempsey   MRN: [id number]     : 1970        HPI: Eric Dempsey is a 48 y.o. female who presents for initial screening colonoscopy via direct scheduling. She was given a 1 day prep. She did well with it. She has no GI symptoms. She has had a BTL and lap felipe. She denies FH of colon or other cancers. She speaks enough English to understand and answer questions. Past Medical History:   Diagnosis Date    GERD (gastroesophageal reflux disease)     Pure hypercholesterolemia 5/3/2016    Vocal cord dysfunction      Past Surgical History:   Procedure Laterality Date    BRONCHOSCOPY  2016    for hemoptysis--negative    HX CHOLECYSTECTOMY      HX GYN      tubal ligation    HX LARYNGECTOMY  08/26/15    HX ROTATOR CUFF REPAIR Right      Current Facility-Administered Medications   Medication Dose Route Frequency    lactated Ringers infusion 1,000 mL  1,000 mL IntraVENous CONTINUOUS     ALLERGIES:  Zithromax [azithromycin]    Social History     Socioeconomic History    Marital status:      Spouse name: Not on file    Number of children: Not on file    Years of education: Not on file    Highest education level: Not on file   Tobacco Use    Smoking status: Never Smoker    Smokeless tobacco: Never Used   Vaping Use    Vaping Use: Never used   Substance and Sexual Activity    Alcohol use: Not Currently     Alcohol/week: 0.0 standard drinks    Drug use: No    Sexual activity: Yes     Partners: Male   Social History Narrative     and lives with . Has lived in [place] and [place]. Has one dog.   Previously worked as a .       [facility] Strain:     Difficulty of Paying Living Expenses:    Food Insecurity:     Worried About [facility] in the Last Year:     [facility] of Food in the Last Year:    Transportation Needs:     Lack of Transportation (Medical):  Lack of Transportation (Non-Medical):    Physical Activity:     Days of Exercise per Week:     Minutes of Exercise per Session:    Stress:     Feeling of Stress :    Social Connections:     Frequency of Communication with Friends and Family:     Frequency of Social Gatherings with Friends and Family:     Attends [employer] Services:     Active Member of Clubs or Organizations:     Attends Club or Organization Meetings:     Marital Status:      Social History     Tobacco Use   Smoking Status Never Smoker   Smokeless Tobacco Never Used     Family History   Problem Relation Age of Onset    Coronary Artery Disease Mother     Coronary Artery Disease Father        ROS: The patient has no difficulty with chest pain or shortness of breath. No fever or chills. The patient denies any personal or family history of abnormal clotting or bleeding. Comprehensive review of systems was otherwise unremarkable except as noted above. Physical Exam:   Constitutional: Alert oriented cooperative patient in no acute distress. Visit Vitals  BP (!) 148/85   Pulse 62   Temp 98.3 °F (36.8 °C)   Resp 20   LMP 04/25/2016 Comment: tubal ligation   SpO2 99%     Eyes:Sclera are clear without icterus. ENMT: no obvious neck masses, no ear or lip lesions  CV: RRR. Normal perfusion  Resp: No JVD. Breathing is  non-labored. GI: soft and non-distended     Musculoskeletal: unremarkable with normal function.    Neuro:  No obvious focal deficits  Psychiatric: normal affect and mood, no memory impairment    Lab Results   Component Value Date/Time    WBC 8.2 05/02/2021 04:24 PM    HGB 14.8 05/02/2021 04:24 PM    HCT 45.1 05/02/2021 04:24 PM    PLATELET 262 03/92/4923 04:24 PM    MCV 91.5 05/02/2021 04:24 PM       Lab Results   Component Value Date/Time    Sodium 139 05/02/2021 04:24 PM    Potassium 4.8 05/02/2021 04:24 PM    Chloride 109 (H) 05/02/2021 04:24 PM    CO2 24 05/02/2021 04:24 PM    BUN 11 05/02/2021 04:24 PM    Creatinine 0.58 (L) 05/02/2021 04:24 PM    Glucose 105 (H) 05/02/2021 04:24 PM    Bilirubin, total 0.3 05/02/2021 04:24 PM    ALT (SGPT) 39 05/02/2021 04:24 PM    Alk. phosphatase 81 05/02/2021 04:24 PM    Lipase 212 01/15/2017 01:15 PM       Assessment/Plan:     Jose Madden is a 48 y.o. female who presents for initial screening colonoscopy. She is of average risk. She is appropriate for the pediatric scope. We discussed proceeding with colonoscopy. I discussed the patient's condition and treatment options with the patient. I discussed risks of colonoscopy in language the patient could understand including bleeding, infection, aspiration, perforation, medication reaction, need for further endoscopy or surgery, abscess, fistula, SBO, DVT, PE, heart attack, stroke, renal failure, respiratory failure, ventilatory dependence, and death. The patient voiced understanding of all this and all questions were answered. Alternatives to colonoscopy were discussed also and risks of the alternatives. The patient requested that we proceed with colonoscopy. Informed consent was obtained. Problem List  Date Reviewed: 1/5/2021        Codes Class Noted    Vaginal bleeding ICD-10-CM: N93.9  ICD-9-CM: 623.8  5/2/2021        Fibroids ICD-10-CM: D21.9  ICD-9-CM: 215.9  5/2/2021        Vulvovaginal candidiasis ICD-10-CM: B37.3  ICD-9-CM: 112.1  7/25/2019        History of uterine fibroid ICD-10-CM: S22.723  ICD-9-CM: V13.29  7/3/2019        Chronic pelvic pain in female ICD-10-CM: R10.2, G89.29  ICD-9-CM: 625.9, 338.29  7/3/2019    Overview Signed 7/3/2019 11:00 AM by Zafar PLATT DO     Pain of 1 years duration.   Midline uterine and crampy in nature  Patient has concerns associated with 2 small fibroids noted on previous vaginal ultrasound 2 years ago  Both measured 3 cm or smaller             Dyspareunia in female ICD-10-CM: N94.10  ICD-9-CM: 625.0  7/3/2019    Overview Signed 7/3/2019 10:59 AM by Lalo Reyes DO     6 months duration             Bacterial vaginosis ICD-10-CM: N76.0, B96.89  ICD-9-CM: 616.10, 041.9  7/3/2019        Hot flashes due to menopause ICD-10-CM: N95.1  ICD-9-CM: 627.2  7/3/2019        Fibromyalgia ICD-10-CM: M79.7  ICD-9-CM: 729.1  10/5/2018        Mild persistent asthma with acute exacerbation ICD-10-CM: J45.31  ICD-9-CM: 493.92  3/9/2018        Precordial pain ICD-10-CM: R07.2  ICD-9-CM: 786.51  11/11/2016        Family history of ischemic heart disease (IHD) ICD-10-CM: Z82.49  ICD-9-CM: V17.3  11/11/2016        LPRD (laryngopharyngeal reflux disease) ICD-10-CM: K21.9  ICD-9-CM: 478.79  10/27/2016        Vocal cord dysfunction ICD-10-CM: J38.3  ICD-9-CM: 478.5  10/27/2016        Hemoptysis ICD-10-CM: R04.2  ICD-9-CM: 786.30  9/15/2016        Pure hypercholesterolemia ICD-10-CM: E78.00  ICD-9-CM: 272.0  5/3/2016        Environmental and seasonal allergies ICD-10-CM: J30.89  ICD-9-CM: 477.8  3/22/2016        GERD (gastroesophageal reflux disease) ICD-10-CM: K21.9  ICD-9-CM: 530.81  Unknown        Vasomotor rhinitis ICD-10-CM: J30.0  ICD-9-CM: 477.9  Unknown        Disorder of vocal cords ICD-10-CM: J38.3  ICD-9-CM: 478.5  Unknown    Overview Signed 4/26/2016  7:56 AM by Guera Rey, NP     NEGATIVE methacholine challenge on 3/2/16             Liver hemangioma ICD-10-CM: D18.03  ICD-9-CM: 228.04  Tuan Stroud MD,  FACS

## 2021-06-17 NOTE — ANESTHESIA POSTPROCEDURE EVALUATION
Procedure(s):  COLONOSCOPY/ BMI 35  ENDOSCOPIC POLYPECTOMY. total IV anesthesia    Anesthesia Post Evaluation        Patient location during evaluation: PACU  Patient participation: complete - patient participated  Level of consciousness: awake and alert  Pain management: adequate  Airway patency: patent  Anesthetic complications: no  Cardiovascular status: acceptable  Respiratory status: acceptable  Hydration status: acceptable  Post anesthesia nausea and vomiting:  none  Final Post Anesthesia Temperature Assessment:  Normothermia (36.0-37.5 degrees C)      INITIAL Post-op Vital signs:   Vitals Value Taken Time   /75 06/17/21 1101   Temp 36.5 °C (97.7 °F) 06/17/21 1032   Pulse 63 06/17/21 1110   Resp 12 06/17/21 1051   SpO2 100 % 06/17/21 1110   Vitals shown include unvalidated device data.

## 2021-06-17 NOTE — DISCHARGE INSTRUCTIONS
Gastrointestinal Colonoscopy/Flexible Sigmoidoscopy - Lower Exam Discharge Instructions  1. Call Dr. Gillian Blank at [facility] for any problems or questions. 2. Contact the doctors office for follow up appointment as directed  3. Medication may cause drowsiness for several hours, therefore:  · Do not drive or operate machinery for reminder of the day. · No alcohol today. · Do not make any important or legal decisions for 24 hours. · Do not sign any legal documents for 24 hours. 4. Ordinarily, you may resume regular diet and activity after exam unless otherwise specified by your physician. 5. Because of air put into your colon during exam, you may experience some abdominal distension, relieved by the passage of gas, for several hours. 6. Contact your physician if you have any of the following:  · Excessive amount of bleeding - large amount when having a bowel movement. Occasional specks of blood with bowel movement would not be unusual.  · Severe abdominal pain  · Fever or Chills  7. Polyp Removal - follow these additional instructions  · Clear liquid diet for the next meal (jell-o, broth, clear drinks)  · Soft diet for 24 hours, then resume regular diet   · Take Metamucil - 1 tablespoon in juice every morning for 3 days  · No Aspirin, Advil, Aleve, Nuprin, Ibuprofen, or medications that contain these drugs for 2 weeks. Any additional instructions:  1. Office will contact you to schedule follow-up appointment and pathology results, repeat colonoscopy in 5 years. Instructions given to Koki Matthew and other family members.

## 2021-06-20 NOTE — PROCEDURES
[address], [address] [facility]  [phone]    Colonoscopy Procedure Note    Name: Eli Marcelino     Date: 6/17/2021  Med Record Number: [id number]   Age: 48 y.o. Sex: female   Procedure: Colonoscopy with polypectomy (hot biopsy)  Pre-operative Diagnosis:  Initial screening for colon cancer  Post-operative Diagnosis: Small R colon polyp, grade 1 internal hemorrhoids  Indications: screening for colon cancer  Anesthesia/Sedation: MAC IV MAC anesthesia Propofol  Procedure Details:    Informed consent was obtained for the procedure, including sedation. Risks of perforation, hemorrhage, adverse drug reaction and aspiration were discussed. The patient was placed in the left lateral decubitus position. Based on the pre-procedure assessment, including review of the patient's medical history, medications, allergies, and review of systems, she had been deemed to be an appropriate candidate for sedation by the Anesthesia Dept. The patient was monitored continuously with ECG tracing, pulse oximetry, blood pressure monitoring, and direct observations. A time out was performed. Once sedation was adequate, a rectal examination was performed. The [id number] was inserted into the rectum and advanced under direct vision to the cecum, which was identified by the ileocecal valve and appendiceal orifice. The quality of the colonic preparation was adequate. A careful inspection was made as the colonoscope was withdrawn, including a retroflexed view of the rectum; findings and interventions are described below. Appropriate photodocumentation was obtained. Findings: ANUS: Anal exam reveals no masses or hemorrhoids, sphincter tone is normal.   RECTUM: Rectal exam reveals no masses or hemorrhoids. SIGMOID COLON: The mucosa is normal with good vascular pattern and without ulcers, diverticula, and polyps.    DESCENDING COLON: The mucosa is normal with good vascular pattern and without ulcers, diverticula, and polyps. SPLENIC FLEXURE: The splenic flexure is normal.   TRANSVERSE COLON: The mucosa is normal with good vascular pattern and without ulcers, diverticula, and polyps. HEPATIC FLEXURE: The hepatic flexure is normal.   ASCENDING COLON: The mucosa is normal with good vascular pattern and without ulcers and diverticula. - Protruding lesions: -Pedunculated Polyp(s) size 3 mm removed by polypectomy (hot biopsy)  CECUM: The appendiceal orifice appears normal. The ileocecal valve appears normal.   TERMINAL ILEUM: The terminal ileum was not entered. Specimens: R colon polyp    Estimated Blood Loss:  0-minimum           Complications: None; patient tolerated the procedure well. Attending Attestation: I performed the procedure. Impression:  See post-procedure diagnoses    Recommendations:-Await pathology. , -Follow up with me in 2 weeks. , -Post polypectomy precautions.     Lavon Mckenna MD, FACS

## 2021-07-13 PROBLEM — K63.5 HYPERPLASTIC POLYP OF ASCENDING COLON: Status: ACTIVE | Noted: 2021-07-13

## 2021-08-03 PROBLEM — K21.9 GERD (GASTROESOPHAGEAL REFLUX DISEASE): Status: RESOLVED | Noted: 2021-08-03 | Resolved: 2021-08-03

## 2021-09-30 ENCOUNTER — HOSPITAL ENCOUNTER (OUTPATIENT)
Dept: MAMMOGRAPHY | Age: 51
Discharge: HOME | End: 2021-09-30
Attending: FAMILY MEDICINE

## 2021-09-30 DIAGNOSIS — Z12.31 SCREENING MAMMOGRAM, ENCOUNTER FOR: ICD-10-CM

## 2022-03-18 PROBLEM — Z86.018 HISTORY OF UTERINE FIBROID: Status: ACTIVE | Noted: 2019-07-03

## 2022-03-18 PROBLEM — N93.9 VAGINAL BLEEDING: Status: ACTIVE | Noted: 2021-05-02

## 2022-03-18 PROBLEM — D21.9 FIBROIDS: Status: ACTIVE | Noted: 2021-05-02

## 2022-03-18 PROBLEM — G89.29 CHRONIC PELVIC PAIN IN FEMALE: Status: ACTIVE | Noted: 2019-07-03

## 2022-03-18 PROBLEM — R10.2 CHRONIC PELVIC PAIN IN FEMALE: Status: ACTIVE | Noted: 2019-07-03

## 2022-03-19 PROBLEM — B37.31 VULVOVAGINAL CANDIDIASIS: Status: ACTIVE | Noted: 2019-07-25

## 2022-03-19 PROBLEM — N76.0 BACTERIAL VAGINOSIS: Status: ACTIVE | Noted: 2019-07-03

## 2022-03-19 PROBLEM — N94.10 DYSPAREUNIA IN FEMALE: Status: ACTIVE | Noted: 2019-07-03

## 2022-03-19 PROBLEM — N95.1 HOT FLASHES DUE TO MENOPAUSE: Status: ACTIVE | Noted: 2019-07-03

## 2022-03-19 PROBLEM — B96.89 BACTERIAL VAGINOSIS: Status: ACTIVE | Noted: 2019-07-03

## 2022-03-20 PROBLEM — J45.31 MILD PERSISTENT ASTHMA WITH ACUTE EXACERBATION: Status: ACTIVE | Noted: 2018-03-09

## 2022-03-20 PROBLEM — K63.5 HYPERPLASTIC POLYP OF ASCENDING COLON: Status: ACTIVE | Noted: 2021-07-13

## 2022-03-20 PROBLEM — M79.7 FIBROMYALGIA: Status: ACTIVE | Noted: 2018-10-05

## 2022-04-06 ENCOUNTER — HOSPITAL ENCOUNTER (OUTPATIENT)
Dept: PHYSICAL THERAPY | Age: 52
Discharge: HOME | End: 2022-04-06
Payer: COMMERCIAL

## 2022-04-06 DIAGNOSIS — M79.604 RIGHT LEG PAIN: ICD-10-CM

## 2022-04-06 DIAGNOSIS — M54.41 CHRONIC RIGHT-SIDED LOW BACK PAIN WITH RIGHT-SIDED SCIATICA: ICD-10-CM

## 2022-04-06 DIAGNOSIS — G89.29 CHRONIC RIGHT-SIDED LOW BACK PAIN WITH RIGHT-SIDED SCIATICA: ICD-10-CM

## 2022-04-06 PROCEDURE — 97161 PT EVAL LOW COMPLEX 20 MIN: CPT

## 2022-04-06 PROCEDURE — 97110 THERAPEUTIC EXERCISES: CPT

## 2022-04-07 NOTE — THERAPY EVALUATION
Héctor Thomas  : 1970  Primary: [employer]  Secondary: [place] [facility] at [address]  [address], [address]  [phone]   [phone]          OUTPATIENT PHYSICAL THERAPY:  Initial Assessment 2022   ICD-10: Treatment Diagnosis: M54.41  R sided low back pain with R sided sciatica  Precautions/Allergies:   Zithromax [azithromycin]   TREATMENT PLAN:  Effective Dates: 2022 TO 2022 (90 days). Frequency/Duration: 1 time a week for 60 Day(s), and upon reassessment will adjust frequency and duration as progress indicates. MEDICAL/REFERRING DIAGNOSIS:  Right leg pain [M79.604]  Chronic right-sided low back pain with right-sided sciatica [M54.41, G89.29]   DATE OF ONSET: about 4 months ago  REFERRING PHYSICIAN: Ben Crum DO MD Orders: Quentin Khan and Treat    Return MD Appointment: TBD     INITIAL ASSESSMENT:  Ms. Tamy Richardson presents with R side low back and leg pain, of about 4 months or so duration. Pain has gotten worse in that time. She said it also feels like the thigh is swollen due to the pain. Her pain is consistent with lumbar radiculopathy; she responded well to initial treatment emphasizing lumbar extension postures and exercises. She is expected to continue to benefit from stretching, strengthening, postural awareness and stabilization, and education. PROBLEM LIST (Impacting functional limitations):  1. Decreased Strength  2. Decreased ADL/Functional Activities  3. Decreased Ambulation Ability/Technique  4. Decreased Balance  5. Increased Pain  6. Decreased Activity Tolerance  7. Decreased Flexibility/Joint Mobility  8. Decreased [place] with Home Exercise Program  9. INTERVENTIONS PLANNED: (Treatment may consist of any combination of the following)  1. Balance Exercise  2. Gait Training  3. Home Exercise Program (HEP)  4. Manual Therapy  5.  Neuromuscular Re-education/Strengthening  6. Range of Motion (ROM)  7. Therapeutic Exercise/Strengthening  8. Modalities as needed and appropriate, including Aquatics and taping  9. GOALS: (Goals have been discussed and agreed upon with patient.)  SHORT-TERM FUNCTIONAL GOALS: Time Frame: 4 weeks  1. Decrease pain by 3/10 to improve sitting and standing tolerance  2. [place] in HEP with minimal cueing  3. Be able to demonstrate correct sitting posture and protective body mechanics to minimize spinal stress  DISCHARGE GOALS: Time Frame: 12 weeks  1. Decrease pain to <3/10 to allow walking > 20 min without significant symptoms  2. Improve trunk AROM to WDL for bending & lifting ADL's  3. Improve score on modified Oswestry Index by > 5 points to allow progression to independent program  4. Progression to advanced HEP with no cueing to allow discharge from therapy. OUTCOME MEASURE:   Tool Used: Modified Oswestry Low Back Pain Questionnaire  Score:  Initial: 40/50  Most Recent: X/50 (Date: -- )   Interpretation of Score: Each section is scored on a 0-5 scale, 5 representing the greatest disability. The scores of each section are added together for a total score of 50. MEDICAL NECESSITY:   · Patient is expected to demonstrate progress in strength and range of motion  ·  to decrease low back and R leg pain  · . REASON FOR SERVICES/OTHER COMMENTS:  · Patient continues to require skilled intervention due to chronic R leg and low back pain  · . Total Duration:  PT Patient Time In/Time Out  Time In: 0420  Time Out: 0500    Rehabilitation Potential For Stated Goals: Good  Regarding Trinyyasmeen Saravia's therapy, I certify that the treatment plan above will be carried out by a therapist or under their direction. Thank you for this referral,  Starr Solano, PT     Referring Physician Signature:  Pito Morse DO _______________________________ Date _____________ Information below was gathered on Initial Assessment--  4-6  PAIN/SUBJECTIVE:   Initial: Pain Intensity 1: 7  Post Session:  5/10   HISTORY:   History of Injury/Illness (Reason for Referral):  Pt is not a good historian and getting details of her injury and pain was difficult. Apparently the pain began about 4 months ago. Denies any falls or specific injury to the back. In the time since onset the pain has gotten steadily worse. She describes it as intermittent but present most of the time. Pain is located in the R lower back/flank region and goes into the posterior thigh, lateral calf  and down to the heel. Reports some tingling and numbness in the heel and lower lateral shin. Pain is made worse with : prolonged sitting, standing, prolonged walking, and driving. She also has some pain in supine. The only thing that seems to relieve it is meds. Past Medical History/Comorbidities:   Ms. Greta Britton  has a past medical history of GERD (gastroesophageal reflux disease), Pure hypercholesterolemia (5/3/2016), and Vocal cord dysfunction. Ms. Greta Britton  has a past surgical history that includes hx cholecystectomy; hx gyn; hx laryngectomy (08/26/15); bronchoscopy (09/2016); hx rotator cuff repair (Right); colonoscopy (N/A, 6/17/2021); and colonoscopy,walter ponce/cautery (6/20/2021). Social History/Living Environment:     lives in 1 level house, 1 step to enter  Prior Level of Function/Work/Activity:  Not working--had recent R shoulder surgery and has 20% deficit and is out of work  Dominant Side:         RIGHT    Personal Factors:          Age:  46 y.o.    Ambulatory/Rehab Services H2 Model Falls Risk Assessment   Risk Factors:       No Risk Factors Identified       (1)  Dizziness/Vertigo Ability to Rise from Chair:       (1)  Pushes up, successful in one attempt   Falls Prevention Plan:       No modifications necessary   Total: (5 or greater = High Risk): 0   ©2010 [facility] [address] [place], [place] [address]on [place] Patent #[id number]. Federal Law prohibits the replication, distribution or use without written permission from [facility]   Current Medications:       Current Outpatient Medications:     rosuvastatin (CRESTOR) 20 mg tablet, Take 1 Tablet by mouth nightly., Disp: 90 Tablet, Rfl: 0    omeprazole (PRILOSEC) 40 mg capsule, Take 1 Capsule by mouth daily. , Disp: 90 Capsule, Rfl: 3    cyclobenzaprine (FLEXERIL) 10 mg tablet, Take 1 Tablet by mouth nightly., Disp: 30 Tablet, Rfl: 0    naproxen (Naprosyn) 500 mg tablet, Take 1 Tablet by mouth two (2) times daily (with meals). , Disp: 60 Tablet, Rfl: 0    coenzyme q10 10 mg cap, 1 p.o. daily for prevention of myalgias due to statins, Disp: 30 Capsule, Rfl: 11    montelukast (SINGULAIR) 10 mg tablet, TAKE 1 TABLET BY MOUTH EVERY DAY, Disp: 30 Tab, Rfl: 11    albuterol (PROVENTIL HFA, VENTOLIN HFA, PROAIR HFA) 90 mcg/actuation inhaler, INHALE 2 PUFFS BY MOUTH EVERY 4 HOURS AS NEEDED FOR WHEEZE, Disp: 8.5 Inhaler, Rfl: 5    estradioL (Estrace) 2 mg tablet, Take 1 Tab by mouth daily. , Disp: 93 Tab, Rfl: 4    medroxyPROGESTERone (PROVERA) 2.5 mg tablet, Take 1 Tab by mouth daily. , Disp: 93 Tab, Rfl: 4    neomycin-polymyxin-hydrocortisone, buffered, (PEDIOTIC) 3.5-10,000-1 mg/mL-unit/mL-% otic suspension, Apply 2 drops to affected ear 4 times a day, Disp: 10 mL, Rfl: 0    fluticasone propionate (FLONASE) 50 mcg/actuation nasal spray, SPRAY 2 SPRAYS INTO EACH NOSTRIL EVERY DAY, Disp: 1 Bottle, Rfl: 6    loratadine (CLARITIN) 10 mg tablet, TAKE 1 TABLET BY MOUTH EVERY DAY, Disp: 30 Tab, Rfl: 11   Date Last Reviewed:  4/6/2022     Number of Personal Factors/Comorbidities that affect the Plan of Care: 1-2: MODERATE COMPLEXITY   EXAMINATION:   Observation:  Pt is a pleasant  45 yo female. Seems comfortable to proceed in English without her sister's asst.  Sits with decreased wt on R hip.   Walks with decreased wt bearing on R leg, does not get to full extn in stance. ROM:          Standing trunk flexion--mod limitation, increases pain. Standing extn--mod to min limited,   Passive HS stretch limited to ~ 55 °     Strength:          R hip flex= 3-, knee exnt= 4-, knee flex= 3  DF = 3+ to 4-, EHL= 4-    Neurological Screen:        Neural Tension Tests:  Positive on R        Sensation: intact to LT in L3,L4, L5 dermatomes. Pt reports some intermittent tingling     Functional Mobility:         Gait/Ambulation:  Walks with antalgic gait, slowly. Decreased wt bearing and decreased extn on R in stance        Stairs:  NT         Balance:          Not formally tested--will be affected by pain, slow movement and R LE weakness             Body Structures Involved:  1. Nerves  2. Joints  3. Muscles  4. Ligaments Body Functions Affected:  1. Mental  2. Sensory/Pain  3. Neuromusculoskeletal  4. Movement Related Activities and Participation Affected:  1. General Tasks and Demands  2. Mobility  3. Self Care  4. Domestic Life  5.  Community, Social and [place]   Number of elements (examined above) that affect the Plan of Care: 4+: HIGH COMPLEXITY   CLINICAL PRESENTATION:   Presentation: Evolving clinical presentation with changing clinical characteristics: MODERATE COMPLEXITY   CLINICAL DECISION MAKING:   Use of outcome tool(s) and clinical judgement create a POC that gives a: Questionable prediction of patient's progress: MODERATE COMPLEXITY

## 2022-04-07 NOTE — PROGRESS NOTES
Ayse Cardenas  : 1970  Primary: [employer]  Secondary: [place] [facility] Therapy  [address], [address]  [phone]   [phone]        OUTPATIENT PHYSICAL THERAPY: Daily Treatment Note 2022  Visit Count:  1      ICD-10: Treatment Diagnosis: M54.41  R sided low back pain with R sided sciatica    TREATMENT PLAN:  Effective Dates: 2022 TO 2022 (90 days). Pre-treatment Symptoms/Complaints:  Pt reports severe pain in the R side of low back and down the R leg to the heel. Pain: Initial: Pain Intensity 1: 7 /10 Post Session:  6/10   Medications Last Reviewed:  2022  Updated Objective Findings:  See evaluation note from today  TREATMENT:     Evaluation  (X)    Therapeutic Exercise   (25 min ):  To decrease pain, improve flexibility and motion, and increase strength. Will provide verbal and manual cues as needed to ensure proper performance of the exercises. Will increase range of motion, resistance and intensity as pt tolerates  Spent several minutes on education re: postures and pain  Prolonged prone lying, on elbows  Brief P/A lumbar mobs and R leg stretch into extn  Pt did several sets of prone press ups--cues for full elbow extn  Taught pt sitting posture with lumbar roll--this was essential part of her HEP    Manual Therapy (    ):      Modalities  (    ):      [employer] Portal  Treatment/Session Summary:    · Response to Treatment:  . Pt has severe back and leg pain that is consistent with lumbar radiculopathy. Some of her pain was decreased with initial tx of prone press ups and lumbar extn. She is expected to continue to benefit from this course of therapy, including education re: posture and prevention of future painful episodes.    ·   · Communication/Consultation:  None today  · Equipment provided today:  None today  · Recommendations/Intent for next treatment session: Next visit will focus on decreasing back and leg pain, improving core and LE strength and stability, education re: HEP and postures to minimise and prevent pain. Total Treatment Billable Duration:  45 minutes   Eval. TE 2      Effective Dates: 4/6/2022 TO 7/5/2022 (90 days).     PT Patient Time In/Time Out  Time In: 7347  Time Out: 0500  Fransisca Net, PT    Future Appointments   Date Time Provider Yusuf Vega   4/13/2022 12:30 PM Angelito Rosen, PT [facility]   4/15/2022  9:00 AM SFD US LOGIC 7 UNIT 1 SFDRUS SFD   4/19/2022 11:00 AM Charlee Roman, PT [facility]   4/21/2022  1:45 PM Charlee Roman, PT [facility]   8/8/2022  8:20 AM PST LAB [facility] PST PST   8/15/2022  8:30 AM Marco Gao DO [facility] PST PST

## 2022-04-13 ENCOUNTER — HOSPITAL ENCOUNTER (OUTPATIENT)
Dept: PHYSICAL THERAPY | Age: 52
Discharge: HOME | End: 2022-04-13
Payer: COMMERCIAL

## 2022-04-13 PROCEDURE — 97110 THERAPEUTIC EXERCISES: CPT

## 2022-04-13 NOTE — PROGRESS NOTES
Sari Villarreal  : 1970  Primary: [employer] [facility] [employer]  [place] [facility] Therapy  [address], [address]  [phone]   [phone]        OUTPATIENT PHYSICAL THERAPY: Daily Treatment Note 2022  Visit Count:  2      ICD-10: Treatment Diagnosis: M54.41  R sided low back pain with R sided sciatica    TREATMENT PLAN:  Effective Dates: 2022 TO 2022 (90 days). Pre-treatment Symptoms/Complaints:  Pt reports the pain is still down the right leg and into the buttocks at times. Feels swollen to her. Pain: Initial:   /10 Post Session:  6/10   Medications Last Reviewed:  2022  Updated Objective Findings:  None Today  TREATMENT:       Therapeutic Exercise   (40 min ):  To decrease pain, improve flexibility and motion, and increase strength. Will provide verbal and manual cues as needed to ensure proper performance of the exercises. Will increase range of motion, resistance and intensity as pt tolerates  Nu-step level 3 x 7 min  Prone press up x 10 hold 5-10 sec  Prone on elbows  Prone hip extension 2 x10  palloff press blue 2 x 10  MET for hip abd/add and shot gun technique  Bridging 2 x 10  Long axis right hip traction     Manual Therapy (   min  ):     Modalities  (  na  ):  na    [employer] Portal  Treatment/Session Summary:    · Response to Treatment:  .Pt  Reporting feeling better post treatment. Right leg was shorter with supine to sit testing. Added MET to help approximate. Mild improvement post technique. Pt seemed to like long axis hip traction and it did help to resolve some of her symptoms.   ·   · Communication/Consultation:  None today  · Equipment provided today:  None today  · Recommendations/Intent for next treatment session: Next visit will focus on decreasing back and leg pain, improving core and LE strength and stability, education re: HEP and postures to minimise and prevent pain. Total Treatment Billable Duration:  40 minutes         Effective Dates: 4/6/2022 TO 7/5/2022 (90 days).        Cb Baig, PT    Future Appointments   Date Time Provider Yusuf Silvia   4/15/2022  9:00 AM SFD US LOGIC 7 UNIT 1 SFDRUS SFD   4/19/2022 11:00 AM Loulou Roman, PT [facility]   4/21/2022  1:45 PM Loulou Roman, PT [facility]   8/8/2022  8:20 AM PST LAB [facility] PST PST   8/15/2022  8:30 AM Dior Infante,  [facility] PST PST

## 2022-04-15 ENCOUNTER — HOSPITAL ENCOUNTER (OUTPATIENT)
Dept: ULTRASOUND IMAGING | Age: 52
Discharge: HOME | End: 2022-04-15
Attending: FAMILY MEDICINE
Payer: COMMERCIAL

## 2022-04-15 ENCOUNTER — HOSPITAL ENCOUNTER (OUTPATIENT)
Dept: GENERAL RADIOLOGY | Age: 52
Discharge: HOME | End: 2022-04-15
Attending: FAMILY MEDICINE
Payer: COMMERCIAL

## 2022-04-15 DIAGNOSIS — K43.9 VENTRAL HERNIA WITHOUT OBSTRUCTION OR GANGRENE: ICD-10-CM

## 2022-04-15 DIAGNOSIS — R10.13 EPIGASTRIC DISCOMFORT: ICD-10-CM

## 2022-04-15 DIAGNOSIS — M79.604 RIGHT LEG PAIN: ICD-10-CM

## 2022-04-15 PROCEDURE — 76700 US EXAM ABDOM COMPLETE: CPT

## 2022-04-15 PROCEDURE — 73552 X-RAY EXAM OF FEMUR 2/>: CPT

## 2022-04-19 ENCOUNTER — HOSPITAL ENCOUNTER (OUTPATIENT)
Dept: PHYSICAL THERAPY | Age: 52
Discharge: HOME | End: 2022-04-19
Payer: COMMERCIAL

## 2022-04-19 PROCEDURE — 97110 THERAPEUTIC EXERCISES: CPT

## 2022-04-19 NOTE — PROGRESS NOTES
Kelvin Bonilla  : 1970  Primary: [employer]  Secondary:  [address] at [facility] Therapy  [address], [facility], [address]  [phone]   [phone]        OUTPATIENT PHYSICAL THERAPY: Daily Treatment Note 2022  Visit Count:  3      ICD-10: Treatment Diagnosis: M54.41  R sided low back pain with R sided sciatica    TREATMENT PLAN:  Effective Dates: 2022 TO 2022 (90 days). Pre-treatment Symptoms/Complaints:  Pt reports the pain is still down the right leg and into the buttocks at times. Not constant. Not much better. Pain: Initial: Pain Intensity 1: 6 /10 Post Session:  3/10   Medications Last Reviewed:  2022  Updated Objective Findings:  None Today  TREATMENT:       Therapeutic Exercise   (45 min ):  To decrease pain, improve flexibility and motion, and increase strength. Will provide verbal and manual cues as needed to ensure proper performance of the exercises. Will increase range of motion, resistance and intensity as pt tolerates  Nu-step level up to level 3 x 8  Min  Total body stretch, hips forwd and backwd, hands on plinth--2 x 6  Standing birddog--2 x 10  Repeated sit to stand--no pain--2 x 10  Prone press up --2 x 10--   Prone on elbows  Prone hip extension 2 x10  palloff press blue 2 x 10--- held today  MET for hip abd/add and shot gun technique----- held today  Bridging 2 x 10---  Long axis right hip traction     Manual Therapy (   min  ):       [employer] Portal  Treatment/Session Summary:    · Response to Treatment:  . Pt reporting feeling better post treatment. Still has min limp in gait. Standing trunk flexion still produces and peripheralizes her low back pain. Pt seemed to like long axis hip traction and it did help to resolve some of her symptoms. She reports compliance with ex at home, and reported nice decrease in pain level today after treatment.   ·   · Communication/Consultation:  None today  · Equipment provided today:  None today  · Recommendations/Intent for next treatment session: Next visit will focus on decreasing back and leg pain, improving core and LE strength and stability, education re: HEP and postures to minimise and prevent pain. Total Treatment Billable Duration:  45 minutes         Effective Dates: 4/6/2022 TO 7/5/2022 (90 days).     PT Patient Time In/Time Out  Time In: 1105  Time Out: [place], PT    Future Appointments   Date Time Provider Yusuf Vega   4/21/2022  1:45 PM Kavon Lim PT [facility]   8/8/2022  8:20 AM PST LAB [facility] PST PST   8/15/2022  8:30 AM DO [address] PST PST

## 2022-04-21 ENCOUNTER — HOSPITAL ENCOUNTER (OUTPATIENT)
Dept: PHYSICAL THERAPY | Age: 52
Discharge: HOME | End: 2022-04-21
Payer: COMMERCIAL

## 2022-04-21 PROCEDURE — 97110 THERAPEUTIC EXERCISES: CPT

## 2022-04-21 NOTE — PROGRESS NOTES
Nicolás Stroud  : 1970  Primary: [employer]  Secondary:  [address] at [facility] Therapy  [address], [facility], [address]  [phone]   [phone]        OUTPATIENT PHYSICAL THERAPY: Daily Treatment Note 2022  Visit Count:  4      ICD-10: Treatment Diagnosis: M54.41  R sided low back pain with R sided sciatica    TREATMENT PLAN:  Effective Dates: 2022 TO 2022 (90 days). Pre-treatment Symptoms/Complaints:  Pt reports the pain is still down the right leg and into the buttocks at times. Not constant. Still gives her trouble for sleeping at night. .  Pain: Initial: Pain Intensity 1: 4 10 Post Session:  3/10   Medications Last Reviewed:  2022  Updated Objective Findings:  None Today  TREATMENT:     Therapeutic Exercise   (30 min ):  To decrease pain, improve flexibility and motion, and increase strength. Will provide verbal and manual cues as needed to ensure proper performance of the exercises. Will increase range of motion, resistance and intensity as pt tolerates    Total body stretch, hips forwd and backwd, hands on plinth--2 x 6---this aggravated her shoulder last time--no longer doing this one  Standing birddog--2 x 10-- held today  Repeated sit to stand--no pain--2 x 10  Prone on elbows prolonged stretching ~ 5-7 min  Prone hip extension 1 x10, with asst from therapist  palloff press blue 2 x 10--- held today  MET for hip abd/add and shot gun technique----- held today  Bridging 1x 10---  Bridging/trunk extn with legs on ball---2 x 10  Long axis right hip traction--5 x 20 sec pulls  Quadruped--stretching like chid's pose, cat/cow  In quadruped, did alternating hip extn. UEs did not allow birddog     Manual Therapy (   min  ):       [employer] Portal  Treatment/Session Summary:    · Response to Treatment:     Still has min limp in gait but it is less now.  . Standing trunk flexion still produces and peripheralizes her low back pain. She reports compliance with ex at home, and reported nice decrease in pain level today after treatment. Seems pleased with progress so far. Plans to make more appts to continue with therapy. ·   · Communication/Consultation:  None today  · Equipment provided today:  None today  · Recommendations/Intent for next treatment session: Next visit will focus on decreasing back and leg pain, improving core and LE strength and stability, education re: HEP and postures to minimise and prevent pain. Total Treatment Billable Duration:    30 minutes         Effective Dates: 4/6/2022 TO 7/5/2022 (90 days).     PT Patient Time In/Time Out  Time In: 0200  Time Out: 0230  Alyson Rosen, JUAN    Future Appointments   Date Time Provider Yusuf Vega   8/8/2022  8:20 AM PST LAB [facility] PST PST   8/15/2022  8:30 AM Familia Cain DO [facility] PST PST

## 2022-04-28 ENCOUNTER — APPOINTMENT (OUTPATIENT)
Dept: PHYSICAL THERAPY | Age: 52
End: 2022-04-28
Payer: COMMERCIAL

## 2022-05-02 ENCOUNTER — HOSPITAL ENCOUNTER (OUTPATIENT)
Dept: PHYSICAL THERAPY | Age: 52
Discharge: HOME | End: 2022-05-02
Payer: COMMERCIAL

## 2022-05-02 PROCEDURE — 97110 THERAPEUTIC EXERCISES: CPT

## 2022-05-02 PROCEDURE — 97140 MANUAL THERAPY 1/> REGIONS: CPT

## 2022-05-02 NOTE — PROGRESS NOTES
Og Vernon  : 1970  Primary: [employer]  Secondary:  [address] at [facility] Therapy  [address], [facility], [address]  [phone]   [phone]        OUTPATIENT PHYSICAL THERAPY: Daily Treatment Note 2022  Visit Count:  5      ICD-10: Treatment Diagnosis: M54.41  R sided low back pain with R sided sciatica    TREATMENT PLAN:  Effective Dates: 2022 TO 2022 (90 days). Pre-treatment Symptoms/Complaints:  Pt reports there is still pain and electrical in nature every time she takes a step. Pain: Initial: Pain Intensity 1: 4 /10 Post Session:  3/10   Medications Last Reviewed:  2022  Updated Objective Findings:  None Today  TREATMENT:     Therapeutic Exercise   (30 min ):  To decrease pain, improve flexibility and motion, and increase strength. Will provide verbal and manual cues as needed to ensure proper performance of the exercises. Will increase range of motion, resistance and intensity as pt tolerates    Nu-step level 4 x 8 min  Standing birddog--20   Repeated sit to stand--no pain--2 x 10-held  Prone on elbows prolonged stretching ~ 5-7 min  Prone hip extension 1 x10,  palloff press blue 2 x 10-  MET for hip abd/add and shot gun technique  Bridging 2x 10---  Bridging/trunk extn with legs on ball---2 x 10-held  Long axis right hip traction--5 x 20 sec pulls-held  Quadruped rocking into javier pose and slightly into lumbar extension  Lumbar stretching into extension with ball against wall x 10    Manual Therapy (   15 min  ): STM with trigger point release in buttocks and down the right leg, hamstrings      [employer] Portal  Treatment/Session Summary:    · Response to Treatment:    Very tender with pressure to the right buttocks and down the hamstring region and behind the knee. Felt better post treatment and able to walk and place more pressure on the right leg.     · Communication/Consultation:  None today  · Equipment provided today:  None today  · Recommendations/Intent for next treatment session: Next visit will focus on decreasing back and leg pain, improving core and LE strength and stability, education re: HEP and postures to minimize and prevent pain. Total Treatment Billable Duration:    45 minutes         Effective Dates: 4/6/2022 TO 7/5/2022 (90 days).     PT Patient Time In/Time Out  Time In: 1230  Time Out: [address], PT    Future Appointments   Date Time Provider Yusuf Vega   5/4/2022  1:00 PM Robby Roman, PT [facility]   5/9/2022 11:00 AM Tiara Mcbride, PT [facility]   5/17/2022  2:30 PM Romayne Roughen, [place], PT [facility]   5/19/2022  2:30 PM Robby Roman, PT [facility]   8/8/2022  8:20 AM PST LAB [facility] PST PST   8/15/2022  8:30 AM Ben Crum DO [facility] PST PST

## 2022-05-04 ENCOUNTER — HOSPITAL ENCOUNTER (OUTPATIENT)
Dept: PHYSICAL THERAPY | Age: 52
Discharge: HOME | End: 2022-05-04
Payer: COMMERCIAL

## 2022-05-04 PROCEDURE — 97110 THERAPEUTIC EXERCISES: CPT

## 2022-05-09 ENCOUNTER — HOSPITAL ENCOUNTER (OUTPATIENT)
Dept: PHYSICAL THERAPY | Age: 52
Discharge: HOME | End: 2022-05-09
Payer: COMMERCIAL

## 2022-05-09 PROCEDURE — 97110 THERAPEUTIC EXERCISES: CPT

## 2022-05-09 NOTE — PROGRESS NOTES
Belen Pedro Pablo  : 1970  Primary: [employer]  Secondary:  [address] at [facility] Therapy  [address], [address]  [phone]   [phone]        OUTPATIENT PHYSICAL THERAPY: Daily Treatment Note 2022  Visit Count:  7      ICD-10: Treatment Diagnosis: M54.41  R sided low back pain with R sided sciatica    TREATMENT PLAN:  Effective Dates: 2022 TO 2022 (90 days). Pre-treatment Symptoms/Complaints:  Pt reports she is slowly improving, but continues to have lumbar pain and pain down the right leg  . Pain: Initial: Pain Intensity 1: 4 /10 Post Session:  3/10   Medications Last Reviewed:  2022  Updated Objective Findings:  None Today  TREATMENT:     Therapeutic Exercise   (40 min ):  To decrease pain, improve flexibility and motion, and increase strength. Will provide verbal and manual cues as needed to ensure proper performance of the exercises. Will increase range of motion, resistance and intensity as pt tolerates      Repeated sit to stand--no pain--2 x 10-held  Prone on elbows prolonged stretching ~ 4-5 min-held  Bridging 2x 10,  With hip abduction red TB  Passive, Prone hip extension stretch 1 x10-held    Long axis right hip traction--5 x 20 sec pulls-held  Prone lying with hips offset to the L-~ 4 min-held  Prone press ups with hips offset to L--small excursion--seemed to decrease pain slightly, lessen it in the foot. -held    MET for hip abd/add and shot gun technique---held   palloff press blue 2 x 10  Quadruped rocking into javier pose and slightly into lumbar extension x 20  Standing low row march blue TB x 20  Standing opp arm and leg lifts   Hip hike bilaterally against wall each leg x 15  Lumbar stretching into extension with ball against wall x 10  --held  Supine hip abduction red TB x 30  Manual Therapy (    5 min  ): PA's to lower back on right side    [employer] Portal  Treatment/Session Summary: · Response to Treatment:   Limp present during treatment, but after working on mat table and after some light PA's to lower back the pain down her leg improved and limp lessened. She might try to add tennis ball massage or pressure to right side of low back to see if this helps alleviate her pain. · Communication/Consultation:  None today  · Equipment provided today:  None today  · Recommendations/Intent for next treatment session: Next visit will focus on decreasing back and leg pain, improving core and LE strength and stability, education re: HEP and postures to minimize and prevent pain. Total Treatment Billable Duration:    45 minutes         Effective Dates: 4/6/2022 TO 7/5/2022 (90 days).     PT Patient Time In/Time Out  Time In: 1100  Time Out: Alf[address], PT    Future Appointments   Date Time Provider Yusuf Vega   5/17/2022  2:30 PM Dina Davis, PT [facility]   5/19/2022  2:30 PM Jericho Roman, PT [facility]

## 2022-05-17 ENCOUNTER — HOSPITAL ENCOUNTER (OUTPATIENT)
Dept: PHYSICAL THERAPY | Age: 52
Discharge: HOME | End: 2022-05-17
Payer: COMMERCIAL

## 2022-05-17 PROCEDURE — 97110 THERAPEUTIC EXERCISES: CPT

## 2022-05-17 PROCEDURE — 97140 MANUAL THERAPY 1/> REGIONS: CPT

## 2022-05-17 NOTE — PROGRESS NOTES
Sari Villarreal  : 1970  Primary: [employer]  Secondary:  [address] at [facility] Therapy  [address], [facility], [address]  [phone]   [phone]        OUTPATIENT PHYSICAL THERAPY: Daily Treatment Note 2022  Visit Count:  8      ICD-10: Treatment Diagnosis: M54.41  R sided low back pain with R sided sciatica    TREATMENT PLAN:  Effective Dates: 2022 TO 2022 (90 days). Pre-treatment Symptoms/Complaints:  Pt reports she is slowly improving, but continues to have lumbar pain and pain down the right leg. She said that it still wakes her at night and she cannot walk or stand long before the sx increase. .  Pain: Initial: Pain Intensity 1: 4 /10 Post Session:  3/10   Medications Last Reviewed:  2022  Updated Objective Findings:  None Today  TREATMENT:     Therapeutic Exercise   (25 min ):  To decrease pain, improve flexibility and motion, and increase strength. Will provide verbal and manual cues as needed to ensure proper performance of the exercises. Will increase range of motion, resistance and intensity as pt tolerates    Nustep--level 2--6 min  Repeated sit to stand--no pain--2 x 10-held  Prone on elbows prolonged stretching ~ 4-5 min-held  Bridging 2x 10,    Passive, Prone hip extension stretch 1 x10-    Long axis right hip traction--5 x 20 sec pulls-  Prone press ups with PT overpressure-seemed to decrease pain slightly    MET for hip abd/add and shot gun technique---held   palloff press blue 2 x 10---held  Quadruped rocking into javier pose and slightly into lumbar extension x 20--held  Standing low row march blue TB x 20---held  Standing opp arm and leg lifts --held  Hip hike bilaterally against wall each leg x 15---held  Lumbar stretching into extension with ball against wall x 10  --held  Supine hip abduction red TB x 30    Manual Therapy ( 20 min  ): PA's to lower back on right side.   Tried manual lumbar traction with strap--not very effective     MedBridge Portal  Treatment/Session Summary:    · Response to Treatment:   Limp present during treatment, but after working on mat table and after some light PA's to lower back the pain down her leg improved and limp lessened. She reported that treatment today seemed to help noticeably. She might try to add tennis ball massage or pressure to right side of low back to see if this helps alleviate her pain. She reported that she bought something that sounds lke TRX to use at home. Has only used it once so not sure if it helps with pain. · Communication/Consultation:  None today  · Equipment provided today:  None today  · Recommendations/Intent for next treatment session: Next visit will focus on decreasing back and leg pain, improving core and LE strength and stability, education re: HEP and postures to minimize and prevent pain. Total Treatment Billable Duration:    45 minutes         Effective Dates: 4/6/2022 TO 7/5/2022 (90 days).     PT Patient Time In/Time Out  Time In: 7576  Time Out: [place], PT    Future Appointments   Date Time Provider Yusuf Vega   5/19/2022  2:30 PM Kenton Roman, PT [facility]

## 2022-05-19 ENCOUNTER — HOSPITAL ENCOUNTER (OUTPATIENT)
Dept: PHYSICAL THERAPY | Age: 52
Discharge: HOME | End: 2022-05-19
Payer: COMMERCIAL

## 2022-05-19 PROCEDURE — 97110 THERAPEUTIC EXERCISES: CPT

## 2022-05-19 NOTE — PROGRESS NOTES
Kelvin Bonilla  : 1970  Primary: [employer]  Secondary:  [address] at [facility] Therapy  [address], [facility], [address]  [phone]   [phone]        OUTPATIENT PHYSICAL THERAPY: Daily Treatment Note 2022  Visit Count:  9      ICD-10: Treatment Diagnosis: M54.41  R sided low back pain with R sided sciatica    TREATMENT PLAN:  Effective Dates: 2022 TO 2022 (90 days). Pre-treatment Symptoms/Complaints:  Pt reports that she continues to have lumbar pain and pain down the right leg. She said that it still wakes her at night and she cannot walk or stand long before the sx increase. .  Pain: Initial: Pain Intensity 1: 4 /10 Post Session:  3/10   Medications Last Reviewed:  2022  Updated Objective Findings:  None Today  TREATMENT:     Therapeutic Exercise   (40 min ):  To decrease pain, improve flexibility and motion, and increase strength. Will provide verbal and manual cues as needed to ensure proper performance of the exercises. Will increase range of motion, resistance and intensity as pt tolerates    Repeated sit to stand--no pain--2 x 10-held  Prone on elbows prolonged stretching ~ 4-5 min-  Shuttle leg press---75#--3 x 10 bilat.   25 # with R leg only--2 x 15  Standing hip extn, at machine--37.5#---3 x 15 bilat  Sitting anterior pelvic tilt--2 x 10  Bridging with legs on the ball--- 2x 10,  Passive, Prone hip extension stretch 1 x10-    Long axis right hip traction--5 x 20 sec pulls-  Prone press ups with PT overpressure-seemed to decrease pain slightly      MET for hip abd/add and shot gun technique---held   palloff press blue 2 x 10---held  Quadruped rocking into javier pose and slightly into lumbar extension x 20--held  Standing low row march blue TB x 20---held  Standing opp arm and leg lifts --held  Hip hike bilaterally against wall each leg x 15---held  Lumbar stretching into extension with ball against wall x 10  --held    Manual Therapy (  min  ): PA's to lower back on right side. Tried manual lumbar traction with strap--not very effective     [employer] Portal  Treatment/Session Summary:    · Response to Treatment:   Limp still present before and  during treatment, but after working on mat table and after some light PA's to lower back the pain down her leg improved and limp lessened. She reported that treatment today seemed to help noticeably. Still reports pain in the leg to the heel that keeps her from walking the distances she needs to walk for work and to return to previous mobility level. She reported that she bought something that sounds lke TRX to use at home. Has only used it once so not sure if it helps with pain. · Communication/Consultation:  None today  · Equipment provided today:  None today  · Recommendations/Intent for next treatment session: Next visit will focus on decreasing back and leg pain, improving core and LE strength and stability, education re: HEP and postures to minimize and prevent pain. Total Treatment Billable Duration:    45 minutes         Effective Dates: 4/6/2022 TO 7/5/2022 (90 days). PT Patient Time In/Time Out  Time In: 0230  Time Out: 0315  True Sorrow, PT    No future appointments.

## 2022-05-20 NOTE — PROGRESS NOTES
Aarti Scanlon  : 1970  Primary: [facility]  Secondary:  [address] @ [facility]  [address] [facility] [address]  Phone: [phone]  Fax: [phone] No data recorded  No data recorded    PT Visit Info:    No data recorded    OUTPATIENT PHYSICAL THERAPY:OP NOTE TYPE: Treatment Note 2022     Appt Desk   Episode      Treatment Diagnosis:  M54.5  Low back pain  Medical/Referring Diagnosis:  m79.604  m54.41  Referring Physician: Skye Parra MD MD Orders:  PT Eval and Treat   Date of Onset:  No data recorded   Allergies:  Azithromycin  Restrictions/Precautions:    No data recordedNo data recorded   Interventions Planned (Treatment may consist of any combination of the following):    No data recorded   Subjective Comments: The back is maybe a little better. Still there is pain with with walking. Her chief complaint is with trying to do more walking. Initial:     5/10 Post Session:     4/10  Medications Last Reviewed:  2022  Updated Objective Findings:  None Today  Treatment     Therapeutic Exercise   (25 min ):  To decrease pain, improve flexibility and motion, and increase strength. Will provide verbal and manual cues as needed to ensure proper performance of the exercises. Will increase range of motion, resistance and intensity as pt tolerates     Repeated sit to stand--no pain--2 x 10-held   held  Prone on elbows prolonged stretching ~ 4-5 min-    held  Shuttle leg press---75#--3 x 10 bilat.   25 # with R leg only--2 x 15  Standing hip extn, at machine--37.5#---3 x 15 bilat    held  Sitting anterior pelvic tilt--2 x 10    Passive, Prone hip extension stretch 1 x10-    Long axis right hip traction--5 x 20 sec pulls-  Prone press ups with PT overpressure-seemed to decrease pain slightly      Modalities:  ( 20 min)   Intermittent mechanical traction--70#--20 min       Traction used for long axis distraction and spinal decompression  Treatment/Session Summary:    · Treatment Assessment:   Pt does seem to appreciate coming, and says she is slightly better, but remains fairly stagnant with her progress. She reports consistent compliance with HEP. · Communication/Consultation:  None today  · Equipment provided today:  None  · Recommendations/Intent for next treatment session: Next visit will focus on decreasing low back and R leg pain to promote return to previous mobility, functional, and comfort level.  .    Total Treatment Billable Duration:  40 minutes       Jamar Mays PT       Post Session Pain  Charge Capture  [employer] Portal  MD Guidelines  Scanned Media  Benefits  MyChart

## 2022-05-24 ENCOUNTER — HOSPITAL ENCOUNTER (OUTPATIENT)
Dept: PHYSICAL THERAPY | Age: 52
Setting detail: RECURRING SERIES
Discharge: HOME | End: 2022-05-27
Payer: COMMERCIAL

## 2022-05-24 ENCOUNTER — APPOINTMENT (OUTPATIENT)
Dept: PHYSICAL THERAPY | Age: 52
End: 2022-05-24
Payer: COMMERCIAL

## 2022-05-24 PROCEDURE — 97110 THERAPEUTIC EXERCISES: CPT

## 2022-05-24 PROCEDURE — 97012 MECHANICAL TRACTION THERAPY: CPT

## 2022-05-24 ASSESSMENT — PAIN SCALES - GENERAL: PAINLEVEL_OUTOF10: 5

## 2022-05-26 ENCOUNTER — APPOINTMENT (OUTPATIENT)
Dept: PHYSICAL THERAPY | Age: 52
End: 2022-05-26
Payer: COMMERCIAL

## 2022-05-31 ENCOUNTER — APPOINTMENT (OUTPATIENT)
Dept: PHYSICAL THERAPY | Age: 52
End: 2022-05-31
Payer: COMMERCIAL

## 2022-05-31 ENCOUNTER — HOSPITAL ENCOUNTER (OUTPATIENT)
Dept: PHYSICAL THERAPY | Age: 52
Setting detail: RECURRING SERIES
Discharge: HOME | End: 2022-06-03
Payer: COMMERCIAL

## 2022-05-31 PROCEDURE — 97012 MECHANICAL TRACTION THERAPY: CPT

## 2022-05-31 PROCEDURE — 97110 THERAPEUTIC EXERCISES: CPT

## 2022-05-31 ASSESSMENT — PAIN SCALES - GENERAL: PAINLEVEL_OUTOF10: 3

## 2022-05-31 NOTE — PROGRESS NOTES
Maryan Hickey  : 1970  Primary: [facility]  Secondary:  [address] @ [facility]  [address] [facility] [address]  Phone: [phone]  Fax: [phone] No data recorded  No data recorded    PT Visit Info:    No data recorded    OUTPATIENT PHYSICAL THERAPY:OP NOTE TYPE: Treatment Note 2022     Appt Desk   Episode      Treatment Diagnosis:  M54.5  Low back pain  Medical/Referring Diagnosis:  m79.604  m54.41  Referring Physician: Chaparro Bonds MD MD Orders:  PT Eval and Treat   Date of Onset:  No data recorded   Allergies:  Azithromycin  Restrictions/Precautions:    No data recordedNo data recorded   Interventions Planned (Treatment may consist of any combination of the following):    No data recorded Subjective Comments: The back is maybe a little better. Not much difference today. Initial:     3/10 Post Session:     3/10  Medications Last Reviewed:  2022  Updated Objective Findings:  None Today  Treatment     Therapeutic Exercise   (25 min ):  To decrease pain, improve flexibility and motion, and increase strength. Will provide verbal and manual cues as needed to ensure proper performance of the exercises. Will increase range of motion, resistance and intensity as pt tolerates     Seated HS stretch, R leg--4 x 15 sec hold  Standing hip extn, at machine--37.5#---2x 12 bilat  Prone press ups with PT overpressure-seemed to decrease pain slightly  P/A mobs for lumbar spine--2 x 10      Repeated sit to stand--no pain--2 x 10-    held  Prone on elbows prolonged stretching ~ 4-5 min-    held  Shuttle leg press---75#--3 x 10 bilat.   25 # with R leg only--2 x 15      held  Sitting anterior pelvic tilt--2 x 10- held        Modalities:  ( 20 min)   Intermittent mechanical traction--80#--20 min       Traction used for long axis distraction and spinal decompression  Treatment/Session Summary:    · Treatment Assessment:   Pt does seem to appreciate coming, and says she is slightly better, but remains fairly stagnant with her progress. Her Oswestry score still indicates very high and interruptive levels of pain. She does still consistently reports pain in the R heel and up the leg with walking, but still says she is better since starting PT. She reports consistent compliance with HEP. Made plans to continue PT 1 x per week for 2 more weeks. · Communication/Consultation:  None today  · Equipment provided today:  None  · Recommendations/Intent for next treatment session: Next visit will focus on decreasing low back and R leg pain to promote return to previous mobility, functional, and comfort level.  .    Total Trtment Billable Duration:  45 minutes  Time In: 0718  Time Out: 0320    Laura Pritchett PT       Post Session Pain  Charge Capture  [employer] Portal  MD Guidelines  Scanned Media  Benefits  MyChart

## 2022-06-07 ENCOUNTER — HOSPITAL ENCOUNTER (OUTPATIENT)
Dept: PHYSICAL THERAPY | Age: 52
Setting detail: RECURRING SERIES
Discharge: HOME | End: 2022-06-10
Payer: COMMERCIAL

## 2022-06-07 PROCEDURE — 97110 THERAPEUTIC EXERCISES: CPT

## 2022-06-07 ASSESSMENT — PAIN SCALES - GENERAL: PAINLEVEL_OUTOF10: 3

## 2022-06-07 NOTE — PROGRESS NOTES
Michael Markham  : 1970  Primary: [facility]  Secondary:  [address] @ [facility]  [address] [facility] [address] [place] [address]  Phone: [phone]  Fax: [phone] No data recorded  No data recorded    PT Visit Info:    No data recorded    OUTPATIENT PHYSICAL THERAPY:OP NOTE TYPE: Treatment Note 2022     Appt Desk   Episode      Treatment Diagnosis:  M54.5  Low back pain  Medical/Referring Diagnosis:  m79.604  m54.41  Referring Physician: Ita Hidalgo DO MD Orders:  PT Eval and Treat   Date of Onset:  No data recorded   Allergies:  Azithromycin  Restrictions/Precautions:    No data recordedNo data recorded   Interventions Planned (Treatment may consist of any combination of the following):    No data recorded Subjective Comments:    Not much difference today. Not really much change in the back/leg pain. The leg stillfeels like it will give out. Pain is still sharp . Initial:     3/10 Post Session:     3/10  Medications Last Reviewed:  2022  Updated Objective Findings:  None Today  Treatment     Therapeutic Exercise   (40 min ):  To decrease pain, improve flexibility and motion, and increase strength. Will provide verbal and manual cues as needed to ensure proper performance of the exercises. Will increase range of motion, resistance and intensity as pt tolerates     SciFit--8 min  Seated HS stretch, R leg--4 x 15 sec hold  Standing hip extn, at machine--37.5#---2x 12 bilat  Prone press ups, with 5 sec hold,  with PT overpressure-seemed to decrease pain slightly--3 x 10  P/A mobs for lumbar spine--2 x 10  Repeated sit to stand--no pain--1 x 10-      Prone on elbows prolonged stretching ~ 4-5 min-          Shuttle leg press---75#--3 x 10 bilat.   25 # with R leg only--2 x 15      held  Sitting anterior pelvic tilt--2 x 10- held        Modalities:  (  min)         Traction used for long axis distraction and spinal decompression  Treatment/Session Summary: · Treatment Assessment:   Pt does seem to appreciate coming, and says she is slightly better, but remains fairly stagnant with her progress. Her Oswestry score still indicates very high and interruptive levels of pain. She does still consistently reports pain in the R heel and up the leg with walking, but still says she is better since starting PT. She reports consistent compliance with HEP. Made plans to continue PT 1 x per week for 1 more weeks. Discussed that now may be the time for her to return to dr.  PT seems to have made little improvement, in spite of a variety of treatment approaches/ options. · Communication/Consultation:  None today  · Equipment provided today:  None  · Recommendations/Intent for next treatment session: Next visit will focus on decreasing low back and R leg pain to promote return to previous mobility, functional, and comfort level.  .    Total Trtment Billable Duration:  45 minutes  Time In: 0145  Time Out: 0230    Mago Bond PT       Post Session Pain  Charge Capture  [employer] Portal  MD Guidelines  Scanned Media  Benefits  [facility]

## 2022-06-16 ENCOUNTER — HOSPITAL ENCOUNTER (OUTPATIENT)
Dept: PHYSICAL THERAPY | Age: 52
Setting detail: RECURRING SERIES
Discharge: HOME | End: 2022-06-19
Payer: COMMERCIAL

## 2022-06-16 PROCEDURE — 97110 THERAPEUTIC EXERCISES: CPT

## 2022-06-16 PROCEDURE — 97012 MECHANICAL TRACTION THERAPY: CPT

## 2022-06-16 ASSESSMENT — PAIN SCALES - GENERAL: PAINLEVEL_OUTOF10: 3

## 2022-06-16 NOTE — PROGRESS NOTES
Von Roa  : 1970  Primary: [facility]  Secondary:  [address] @ [facility]  [address] [facility] [address]  Phone: [phone]  Fax: [phone] No data recorded  No data recorded    PT Visit Info:    No data recorded    OUTPATIENT PHYSICAL THERAPY:OP NOTE TYPE: Treatment Note 2022     Appt Desk   Episode      Treatment Diagnosis:  M54.5  Low back pain  Medical/Referring Diagnosis:  m79.604  m54.41  Referring Physician: Adriane Vivas DO MD Orders:  PT Eval and Treat   Date of Onset:  No data recorded   Allergies:  Azithromycin  Restrictions/Precautions:    No data recordedNo data recorded   Interventions Planned (Treatment may consist of any combination of the following):    No data recorded  Comments:    . Pt reported that she fell since last tx, when her R leg did not perform right. She said her pain is a little better, she is sleeping some better now. but pain is still a bother. Initial:     3/10 Post Session:      /10  Medications Last Reviewed:  2022  Updated Objective Findings:  None Today  Treatment     Therapeutic Exercise   (40 min ):  To decrease pain, improve flexibility and motion, and increase strength. Will provide verbal and manual cues as needed to ensure proper performance of the exercises. Will increase range of motion, resistance and intensity as pt tolerates       Seated HS stretch, R leg--4 x 15 sec hold  Prone press ups, with 5 sec hold,  with PT overpressure-seemed to decrease pain slightly--3 x 10  P/A mobs for lumbar spine--2 x 10    Prone on elbows prolonged stretching ~ 4-5 min-   Bridging--1 x 10.    1 leg bridging--8 reps--this was difficult for her  Did ab exercises for stabilization:  Alt heel touch--3 sets to tolerance ( fairly low tolerance)  MMT revealed notable weaker R LE mm, simon hamstrings, DF, and EHL.     Modalities:  (15  min)    Intermittent mechanical lumbar traction, to 90 # x 15 min. Traction used for long axis distraction and spinal decompression    Treatment/Session Summary:    · Treatment Assessment:   Pt says she is slightly better, but remains fairly stagnant with her progress. Her Oswestry score still indicates very high and interruptive levels of pain. She reports consistent compliance with HEP. Pt would like to continue PT a short while longer. She is pleased with her improvement so far it seems, and is compliant and wants to continue. May continue with the traction if she says it helps. · Communication/Consultation:  None today  · Equipment provided today:  None  · Recommendations/Intent for next treatment session: Next visit will focus on decreasing low back and R leg pain to promote return to previous mobility, functional, and comfort level.  .    Total Trtment Billable Duration:  55 minutes  Time In: 0405  Time Out: 0500    Lesley Pacheco PT       Post Session Pain  Charge Capture  [email or web address] Portal  MD Guidelines  Scanned Media  Benefits  MyChart

## 2022-06-23 ENCOUNTER — HOSPITAL ENCOUNTER (OUTPATIENT)
Dept: PHYSICAL THERAPY | Age: 52
Setting detail: RECURRING SERIES
Discharge: HOME | End: 2022-06-26
Payer: COMMERCIAL

## 2022-06-23 PROCEDURE — 97012 MECHANICAL TRACTION THERAPY: CPT

## 2022-06-23 PROCEDURE — 97110 THERAPEUTIC EXERCISES: CPT

## 2022-06-23 ASSESSMENT — PAIN SCALES - GENERAL: PAINLEVEL_OUTOF10: 4

## 2022-06-29 ENCOUNTER — HOSPITAL ENCOUNTER (OUTPATIENT)
Dept: PHYSICAL THERAPY | Age: 52
Setting detail: RECURRING SERIES
End: 2022-06-29
Payer: COMMERCIAL

## 2022-06-30 NOTE — PROGRESS NOTES
Lynwood Holter  : 1970  Primary: [facility]  Secondary:  [address] @ [facility]  [address] [place] [address] [address] [address]  Phone: [phone]  Fax: [phone] No data recorded  No data recorded    PT Visit Info:  No data recorded       OUTPATIENT PHYSICAL THERAPY 2022     Appt Desk   Episode   MyChart      Ms. Iram Landers called to cancel appt toady and rescheduled for future appts.     Laurie Gomez, PT    Future Appointments   Date Time Provider [facility]   2022  2:30 PM Laurie Gomez, PT [facility]   2022  1:45 PM Samantha Salinas, PT [facility]   2022  8:20 AM PST LAB PST [facility]   8/15/2022  8:30 AM Lionel Palomino DO PST [facility]

## 2022-07-12 ENCOUNTER — HOSPITAL ENCOUNTER (OUTPATIENT)
Dept: PHYSICAL THERAPY | Age: 52
Setting detail: RECURRING SERIES
Discharge: HOME | End: 2022-07-15
Payer: COMMERCIAL

## 2022-07-12 PROCEDURE — 97012 MECHANICAL TRACTION THERAPY: CPT

## 2022-07-12 PROCEDURE — 97110 THERAPEUTIC EXERCISES: CPT

## 2022-07-12 ASSESSMENT — PAIN SCALES - GENERAL: PAINLEVEL_OUTOF10: 6

## 2022-07-12 NOTE — PROGRESS NOTES
Mauriceor Slice  : 1970  Primary: [facility]  Secondary:  [address] @ [facility]  [address] [facility] [address]  Phone: [phone]  Fax: [phone] No data recorded  No data recorded    PT Visit Info:    No data recorded    OUTPATIENT PHYSICAL THERAPY:OP NOTE TYPE: Treatment Note 2022     Appt Desk   Episode      Treatment Diagnosis:  M54.5  Low back pain  Medical/Referring Diagnosis:  m79.604  m54.41  Referring Physician: Chica Araujo DO MD Orders:  PT Eval and Treat   Date of Onset:  No data recorded   Allergies:  Azithromycin  Restrictions/Precautions:    No data recordedNo data recorded   Interventions Planned (Treatment may consist of any combination of the following):    No data recorded    Comments:    . The pain is worse. It starts in the heel and goes to my back. At night it is even worse. Initial:     6/10 Post Session:      /10  Medications Last Reviewed:  2022  Updated Objective Findings:  MMT R LE:  Hip flex= 4-, knee extn= 4+, knee flex= 3+, DF= 4-  Treatment     Therapeutic Exercise   (25 min ):  To decrease pain, improve flexibility and motion, and increase strength. Will provide verbal and manual cues as needed to ensure proper performance of the exercises. Will increase range of motion, resistance and intensity as pt tolerates     Prone press ups-- with PT overpressure-seemed to decrease pain slightly--3 x 10  Prone knee flex--3 x 12  Passive prone hip stretch  P/A  Mobs, on R side  for lumbar spine--2 x 10   Flexion rotation lumbar stretch, with asst from PT--~ 6-7 reps, to end range  Bridging--1 x 10, bridging with LEs on ball  1 x 10   Did ab exercises for stabilization:  Alt heel touch--3 sets to tolerance  (fairly low tolerance)--held today     Modalities:  (15  min)    Intermittent mechanical lumbar traction, to 90 # x 15 min.         Traction used for long axis distraction and spinal decompression    Treatment/Session Summary:    · Treatment Assessment:  Pt said that the past week was bad. She was noticeably limping, not putting wt on R heel, when she came in today. Pt says she is slightly better for short periods but the relief does not last. . Her Oswestry score still indicates very high and interruptive levels of pain. She reports consistent compliance with HEP. Pt has appt scheduled with her PCP next week. · Communication/Consultation:  None today  · Equipment provided today:  None  · Recommendations/Intent for next treatment session: Next visit will focus on decreasing low back and R leg pain to promote return to previous mobility, functional, and comfort level.  .    Total Trtment Billable Duration:  45 minutes  Time In: 3514  Time Out: 0320    Gina Rojas PT       Post Session Pain  Charge Capture  [employer] Portal  MD Guidelines  Scanned Media  Benefits  MyChart

## 2022-07-19 ENCOUNTER — OFFICE VISIT (OUTPATIENT)
Dept: FAMILY MEDICINE CLINIC | Facility: CLINIC | Age: 52
End: 2022-07-19
Payer: COMMERCIAL

## 2022-07-19 VITALS
BODY MASS INDEX: 34.96 KG/M2 | DIASTOLIC BLOOD PRESSURE: 80 MMHG | SYSTOLIC BLOOD PRESSURE: 130 MMHG | HEIGHT: 62 IN | WEIGHT: 190 LBS

## 2022-07-19 DIAGNOSIS — G89.29 CHRONIC RIGHT-SIDED LOW BACK PAIN WITH RIGHT-SIDED SCIATICA: Primary | ICD-10-CM

## 2022-07-19 DIAGNOSIS — Z00.00 ROUTINE GENERAL MEDICAL EXAMINATION AT A HEALTH CARE FACILITY: ICD-10-CM

## 2022-07-19 DIAGNOSIS — M54.41 CHRONIC RIGHT-SIDED LOW BACK PAIN WITH RIGHT-SIDED SCIATICA: Primary | ICD-10-CM

## 2022-07-19 PROCEDURE — 96372 THER/PROPH/DIAG INJ SC/IM: CPT | Performed by: FAMILY MEDICINE

## 2022-07-19 PROCEDURE — 99213 OFFICE O/P EST LOW 20 MIN: CPT | Performed by: FAMILY MEDICINE

## 2022-07-19 RX ORDER — CYCLOBENZAPRINE HCL 10 MG
10 TABLET ORAL 2 TIMES DAILY PRN
Qty: 60 TABLET | Refills: 0 | Status: SHIPPED | OUTPATIENT
Start: 2022-07-19 | End: 2022-08-15 | Stop reason: SDUPTHER

## 2022-07-19 RX ORDER — PREDNISONE 10 MG/1
10 TABLET ORAL DAILY
Qty: 21 TABLET | Refills: 0 | Status: SHIPPED | OUTPATIENT
Start: 2022-07-19 | End: 2022-08-15 | Stop reason: ALTCHOICE

## 2022-07-19 RX ORDER — KETOROLAC TROMETHAMINE 30 MG/ML
60 INJECTION, SOLUTION INTRAMUSCULAR; INTRAVENOUS ONCE
Status: COMPLETED | OUTPATIENT
Start: 2022-07-19 | End: 2022-07-19

## 2022-07-19 RX ADMIN — KETOROLAC TROMETHAMINE 60 MG: 30 INJECTION, SOLUTION INTRAMUSCULAR; INTRAVENOUS at 13:19

## 2022-07-19 ASSESSMENT — ENCOUNTER SYMPTOMS
BACK PAIN: 1
SHORTNESS OF BREATH: 0
VOMITING: 0
NAUSEA: 0

## 2022-07-19 NOTE — PROGRESS NOTES
PROGRESS NOTE    SUBJECTIVE:   Tim Bowen is a 46 y.o. female seen for a follow up visit regarding the following chief complaint:     Chief Complaint   Patient presents with    Lower Back Pain     Lower back [pain with R sided sciatica, tingling, R foot and heel pain, unstable gait           HPI patient has a longstanding history of chronic low back pain states she really did not do anything complaining of right sciatica and low back pain      Past Medical History, Past Surgical History, Family history, Social History, and Medications were all reviewed with the patient today and updated as necessary. Current Outpatient Medications   Medication Sig Dispense Refill    predniSONE (DELTASONE) 10 MG tablet Take 1 tablet by mouth in the morning. First 2 days: Take 4 tablets with food  then take 3 tablets with food every morning for 2 days,   then take 2 tablets with food for 2 days  then take 1 tablet with food for 2 days  then take 1/2 tablet for 2 days then stop. . 21 tablet 0    cyclobenzaprine (FLEXERIL) 10 MG tablet Take 1 tablet by mouth 2 times daily as needed for Muscle spasms 60 tablet 0    albuterol sulfate  (90 Base) MCG/ACT inhaler INHALE 2 PUFFS BY MOUTH EVERY 4 HOURS AS NEEDED FOR WHEEZE      Coenzyme Q10 10 MG CAPS 1 p.o. daily for prevention of myalgias due to statins      fluticasone (FLONASE) 50 MCG/ACT nasal spray SPRAY 2 SPRAYS INTO EACH NOSTRIL EVERY DAY      montelukast (SINGULAIR) 10 MG tablet TAKE 1 TABLET BY MOUTH EVERY DAY      neomycin-polymyxin-hydrocortisone (CORTISPORIN) 3.5-32692-6 otic suspension Apply 2 drops to affected ear 4 times a day      omeprazole (PRILOSEC) 40 MG delayed release capsule Take 40 mg by mouth daily      rosuvastatin (CRESTOR) 20 MG tablet Take 20 mg by mouth      estradiol (ESTRACE) 2 MG tablet Take 2 mg by mouth daily (Patient not taking: Reported on 7/19/2022)      loratadine (CLARITIN) 10 MG tablet TAKE 1 TABLET BY MOUTH EVERY DAY medroxyPROGESTERone (PROVERA) 2.5 MG tablet Take 2.5 mg by mouth daily (Patient not taking: Reported on 7/19/2022)      naproxen (NAPROSYN) 500 MG tablet Take 500 mg by mouth 2 times daily (with meals) (Patient not taking: Reported on 7/19/2022)       Current Facility-Administered Medications   Medication Dose Route Frequency Provider Last Rate Last Admin    ketorolac (TORADOL) injection 60 mg  60 mg IntraMUSCular Once Julieta Shirts, DO         Allergies   Allergen Reactions    Azithromycin Hives     Patient Active Problem List   Diagnosis    History of uterine fibroid    LPRD (laryngopharyngeal reflux disease)    Fibroids    Chronic pelvic pain in female    Vaginal bleeding    Disorder of vocal cords    Family history of ischemic heart disease (IHD)    Vulvovaginal candidiasis    Hot flashes due to menopause    Dyspareunia in female    Environmental and seasonal allergies    Bacterial vaginosis    Fibromyalgia    Precordial pain    Pure hypercholesterolemia    Vocal cord dysfunction    Mild persistent asthma with acute exacerbation    Hemoptysis    Hyperplastic polyp of ascending colon    Vasomotor rhinitis    Liver hemangioma     Past Medical History:   Diagnosis Date    GERD (gastroesophageal reflux disease)     Pure hypercholesterolemia 5/3/2016    Vocal cord dysfunction      Past Surgical History:   Procedure Laterality Date    BRONCHOSCOPY  09/2016    for hemoptysis--negative    CHOLECYSTECTOMY      COLONOSCOPY N/A 6/17/2021    COLONOSCOPY/ BMI 35 performed by Denise Azar MD at [facility] ENDOSCOPY    COLONOSCOPY,MICHAEL MORALES/CAUTERY  6/20/2021         GYN      tubal ligation    LARYNGECTOMY  08/26/15    ROTATOR CUFF REPAIR Right      Family History   Problem Relation Age of Onset    Coronary Art Dis Mother     Coronary Art Dis Father      Social History     Tobacco Use    Smoking status: Never    Smokeless tobacco: Never   Substance Use Topics    Alcohol use: Not Currently     Alcohol/week: 0.0 standard drinks         Review of Systems   Constitutional:  Negative for fever. Respiratory:  Negative for shortness of breath. Cardiovascular:  Negative for chest pain. Gastrointestinal:  Negative for nausea and vomiting. Musculoskeletal:  Positive for back pain. OBJECTIVE:  /80 (Site: Left Upper Arm, Position: Sitting, Cuff Size: Small Adult)   Ht 5' 2\" (1.575 m)   Wt 190 lb (86.2 kg)   BMI 34.75 kg/m²      Physical Exam  Vitals and nursing note reviewed. Constitutional:       Appearance: Normal appearance. HENT:      Head: Normocephalic and atraumatic. Right Ear: Tympanic membrane normal.      Left Ear: Tympanic membrane normal.      Nose: Nose normal.      Mouth/Throat:      Mouth: Mucous membranes are moist.   Eyes:      Conjunctiva/sclera: Conjunctivae normal.      Pupils: Pupils are equal, round, and reactive to light. Cardiovascular:      Rate and Rhythm: Normal rate and regular rhythm. Pulses: Normal pulses. Heart sounds: Normal heart sounds. Pulmonary:      Effort: Pulmonary effort is normal.      Breath sounds: Normal breath sounds. Abdominal:      General: Abdomen is flat. Bowel sounds are normal.      Palpations: Abdomen is soft. Musculoskeletal:      Cervical back: Normal range of motion and neck supple. Lumbar back: Spasms and tenderness present. Positive right straight leg raise test.   Skin:     General: Skin is warm and dry. Neurological:      General: No focal deficit present. Mental Status: She is alert and oriented to person, place, and time. Psychiatric:         Behavior: Behavior normal.        Medical problems and test results were reviewed with the patient today. No results found for this or any previous visit (from the past 672 hour(s)).     ASSESSMENT and PLAN    Visit Diagnoses and Associated Orders       Chronic right-sided low back pain with right-sided sciatica    -  Primary    ketorolac (TORADOL) injection 60 mg [64737] predniSONE (DELTASONE) 10 MG tablet [6494]      cyclobenzaprine (FLEXERIL) 10 MG tablet [2017]      MRI LUMBAR SPINE WO CONTRAST [57315 Custom]   - Future Order                     Diagnosis Orders   1. Chronic right-sided low back pain with right-sided sciatica  ketorolac (TORADOL) injection 60 mg    predniSONE (DELTASONE) 10 MG tablet    cyclobenzaprine (FLEXERIL) 10 MG tablet    MRI LUMBAR SPINE WO CONTRAST      , Emily was seen today for lower back pain. Diagnoses and all orders for this visit:    Chronic right-sided low back pain with right-sided sciatica  -     ketorolac (TORADOL) injection 60 mg  -     predniSONE (DELTASONE) 10 MG tablet; Take 1 tablet by mouth in the morning. First 2 days: Take 4 tablets with food  then take 3 tablets with food every morning for 2 days,   then take 2 tablets with food for 2 days  then take 1 tablet with food for 2 days  then take 1/2 tablet for 2 days then stop. .  -     cyclobenzaprine (FLEXERIL) 10 MG tablet; Take 1 tablet by mouth 2 times daily as needed for Muscle spasms  -     MRI LUMBAR SPINE WO CONTRAST;  Future    , We will get an MRI we will start her on a steroid taper Flexeril at bedtime supportive care we will follow-up after to go over her results we gave her a Toradol shot in the office today

## 2022-08-08 ENCOUNTER — NURSE ONLY (OUTPATIENT)
Dept: FAMILY MEDICINE CLINIC | Facility: CLINIC | Age: 52
End: 2022-08-08
Payer: COMMERCIAL

## 2022-08-08 DIAGNOSIS — Z00.00 ROUTINE GENERAL MEDICAL EXAMINATION AT A HEALTH CARE FACILITY: ICD-10-CM

## 2022-08-08 LAB
25(OH)D3 SERPL-MCNC: 28.8 NG/ML (ref 30–100)
ALBUMIN SERPL-MCNC: 3.9 G/DL (ref 3.5–5)
ALBUMIN/GLOB SERPL: 1.2 {RATIO} (ref 1.2–3.5)
ALP SERPL-CCNC: 85 U/L (ref 50–136)
ALT SERPL-CCNC: 36 U/L (ref 12–65)
ANION GAP SERPL CALC-SCNC: 4 MMOL/L (ref 7–16)
AST SERPL-CCNC: 20 U/L (ref 15–37)
BASOPHILS # BLD: 0 K/UL (ref 0–0.2)
BASOPHILS NFR BLD: 1 % (ref 0–2)
BILIRUB SERPL-MCNC: 0.4 MG/DL (ref 0.2–1.1)
BILIRUBIN, URINE, POC: NEGATIVE
BLOOD URINE, POC: NEGATIVE
BUN SERPL-MCNC: 17 MG/DL (ref 6–23)
CALCIUM SERPL-MCNC: 9 MG/DL (ref 8.3–10.4)
CHLORIDE SERPL-SCNC: 111 MMOL/L (ref 98–107)
CHOLEST SERPL-MCNC: 185 MG/DL
CO2 SERPL-SCNC: 25 MMOL/L (ref 21–32)
CREAT SERPL-MCNC: 0.7 MG/DL (ref 0.6–1)
DIFFERENTIAL METHOD BLD: ABNORMAL
EOSINOPHIL # BLD: 0.2 K/UL (ref 0–0.8)
EOSINOPHIL NFR BLD: 3 % (ref 0.5–7.8)
ERYTHROCYTE [DISTWIDTH] IN BLOOD BY AUTOMATED COUNT: 12.8 % (ref 11.9–14.6)
GLOBULIN SER CALC-MCNC: 3.3 G/DL (ref 2.3–3.5)
GLUCOSE SERPL-MCNC: 100 MG/DL (ref 65–100)
GLUCOSE URINE, POC: NEGATIVE
HCT VFR BLD AUTO: 46.2 % (ref 35.8–46.3)
HCV AB SER QL: NONREACTIVE
HDLC SERPL-MCNC: 53 MG/DL (ref 40–60)
HDLC SERPL: 3.5 {RATIO}
HGB BLD-MCNC: 14.7 G/DL (ref 11.7–15.4)
HIV 1+2 AB+HIV1 P24 AG SERPL QL IA: NONREACTIVE
HIV 1/2 RESULT COMMENT: NORMAL
IMM GRANULOCYTES # BLD AUTO: 0 K/UL (ref 0–0.5)
IMM GRANULOCYTES NFR BLD AUTO: 0 % (ref 0–5)
KETONES, URINE, POC: NEGATIVE
LDLC SERPL CALC-MCNC: 109.6 MG/DL
LEUKOCYTE ESTERASE, URINE, POC: NEGATIVE
LYMPHOCYTES # BLD: 2.6 K/UL (ref 0.5–4.6)
LYMPHOCYTES NFR BLD: 42 % (ref 13–44)
MCH RBC QN AUTO: 29.7 PG (ref 26.1–32.9)
MCHC RBC AUTO-ENTMCNC: 31.8 G/DL (ref 31.4–35)
MCV RBC AUTO: 93.3 FL (ref 79.6–97.8)
MONOCYTES # BLD: 0.4 K/UL (ref 0.1–1.3)
MONOCYTES NFR BLD: 7 % (ref 4–12)
NEUTS SEG # BLD: 2.9 K/UL (ref 1.7–8.2)
NEUTS SEG NFR BLD: 47 % (ref 43–78)
NITRITE, URINE, POC: NEGATIVE
NRBC # BLD: 0 K/UL (ref 0–0.2)
PH, URINE, POC: 5.5 (ref 4.6–8)
PLATELET # BLD AUTO: 198 K/UL (ref 150–450)
PMV BLD AUTO: 13 FL (ref 9.4–12.3)
POTASSIUM SERPL-SCNC: 4.3 MMOL/L (ref 3.5–5.1)
PROT SERPL-MCNC: 7.2 G/DL (ref 6.3–8.2)
PROTEIN,URINE, POC: NEGATIVE
RBC # BLD AUTO: 4.95 M/UL (ref 4.05–5.2)
SODIUM SERPL-SCNC: 140 MMOL/L (ref 136–145)
SPECIFIC GRAVITY, URINE, POC: 1.02 (ref 1–1.03)
TRIGL SERPL-MCNC: 112 MG/DL (ref 35–150)
TSH, 3RD GENERATION: 1.14 UIU/ML (ref 0.36–3.74)
URINALYSIS CLARITY, POC: CLEAR
URINALYSIS COLOR, POC: YELLOW
UROBILINOGEN, POC: NORMAL
VLDLC SERPL CALC-MCNC: 22.4 MG/DL (ref 6–23)
WBC # BLD AUTO: 6.1 K/UL (ref 4.3–11.1)

## 2022-08-08 PROCEDURE — 81002 URINALYSIS NONAUTO W/O SCOPE: CPT | Performed by: FAMILY MEDICINE

## 2022-08-10 ENCOUNTER — HOSPITAL ENCOUNTER (OUTPATIENT)
Dept: MRI IMAGING | Age: 52
Discharge: HOME | End: 2022-08-13
Payer: COMMERCIAL

## 2022-08-10 DIAGNOSIS — G89.29 CHRONIC RIGHT-SIDED LOW BACK PAIN WITH RIGHT-SIDED SCIATICA: ICD-10-CM

## 2022-08-10 DIAGNOSIS — M54.41 CHRONIC RIGHT-SIDED LOW BACK PAIN WITH RIGHT-SIDED SCIATICA: ICD-10-CM

## 2022-08-10 PROCEDURE — 72148 MRI LUMBAR SPINE W/O DYE: CPT

## 2022-08-15 ENCOUNTER — OFFICE VISIT (OUTPATIENT)
Dept: FAMILY MEDICINE CLINIC | Facility: CLINIC | Age: 52
End: 2022-08-15
Payer: COMMERCIAL

## 2022-08-15 VITALS
DIASTOLIC BLOOD PRESSURE: 80 MMHG | WEIGHT: 187 LBS | HEIGHT: 62 IN | BODY MASS INDEX: 34.41 KG/M2 | SYSTOLIC BLOOD PRESSURE: 130 MMHG

## 2022-08-15 DIAGNOSIS — M54.41 CHRONIC RIGHT-SIDED LOW BACK PAIN WITH RIGHT-SIDED SCIATICA: ICD-10-CM

## 2022-08-15 DIAGNOSIS — Z78.0 POSTMENOPAUSAL: ICD-10-CM

## 2022-08-15 DIAGNOSIS — E78.01 FAMILIAL HYPERCHOLESTEROLEMIA: ICD-10-CM

## 2022-08-15 DIAGNOSIS — J45.31 MILD PERSISTENT ASTHMA WITH (ACUTE) EXACERBATION: ICD-10-CM

## 2022-08-15 DIAGNOSIS — Z12.31 SCREENING MAMMOGRAM FOR HIGH-RISK PATIENT: ICD-10-CM

## 2022-08-15 DIAGNOSIS — Z13.31 SCREENING FOR DEPRESSION: ICD-10-CM

## 2022-08-15 DIAGNOSIS — G89.29 CHRONIC RIGHT-SIDED LOW BACK PAIN WITH RIGHT-SIDED SCIATICA: ICD-10-CM

## 2022-08-15 DIAGNOSIS — K21.9 GASTRO-ESOPHAGEAL REFLUX DISEASE WITHOUT ESOPHAGITIS: ICD-10-CM

## 2022-08-15 DIAGNOSIS — Z13.820 SCREENING FOR OSTEOPOROSIS: ICD-10-CM

## 2022-08-15 DIAGNOSIS — Z00.00 ROUTINE GENERAL MEDICAL EXAMINATION AT A HEALTH CARE FACILITY: Primary | ICD-10-CM

## 2022-08-15 DIAGNOSIS — J30.89 OTHER ALLERGIC RHINITIS: ICD-10-CM

## 2022-08-15 DIAGNOSIS — N93.8 DUB (DYSFUNCTIONAL UTERINE BLEEDING): ICD-10-CM

## 2022-08-15 PROCEDURE — 99396 PREV VISIT EST AGE 40-64: CPT | Performed by: FAMILY MEDICINE

## 2022-08-15 RX ORDER — MONTELUKAST SODIUM 10 MG/1
10 TABLET ORAL NIGHTLY
Qty: 90 TABLET | Refills: 3 | Status: SHIPPED | OUTPATIENT
Start: 2022-08-15 | End: 2022-11-13

## 2022-08-15 RX ORDER — FLUTICASONE PROPIONATE 50 MCG
1 SPRAY, SUSPENSION (ML) NASAL DAILY
Qty: 16 G | Refills: 5 | Status: SHIPPED | OUTPATIENT
Start: 2022-08-15

## 2022-08-15 RX ORDER — CYCLOBENZAPRINE HCL 10 MG
10 TABLET ORAL 2 TIMES DAILY PRN
Qty: 60 TABLET | Refills: 0 | Status: SHIPPED | OUTPATIENT
Start: 2022-08-15

## 2022-08-15 RX ORDER — LORATADINE 10 MG/1
10 TABLET ORAL DAILY
Qty: 90 TABLET | Refills: 3 | Status: SHIPPED | OUTPATIENT
Start: 2022-08-15 | End: 2022-11-13

## 2022-08-15 RX ORDER — OMEPRAZOLE 40 MG/1
40 CAPSULE, DELAYED RELEASE ORAL DAILY
Qty: 90 CAPSULE | Refills: 3 | Status: SHIPPED | OUTPATIENT
Start: 2022-08-15 | End: 2022-11-13

## 2022-08-15 RX ORDER — ALBUTEROL SULFATE 90 UG/1
2 AEROSOL, METERED RESPIRATORY (INHALATION) EVERY 4 HOURS PRN
Qty: 18 G | Refills: 5 | Status: SHIPPED | OUTPATIENT
Start: 2022-08-15

## 2022-08-15 RX ORDER — ROSUVASTATIN CALCIUM 20 MG/1
20 TABLET, COATED ORAL DAILY
Qty: 90 TABLET | Refills: 3 | Status: SHIPPED | OUTPATIENT
Start: 2022-08-15

## 2022-08-15 RX ORDER — NAPROXEN 500 MG/1
500 TABLET ORAL 2 TIMES DAILY WITH MEALS
Qty: 60 TABLET | Refills: 0 | Status: SHIPPED | OUTPATIENT
Start: 2022-08-15 | End: 2022-09-14

## 2022-08-15 ASSESSMENT — ENCOUNTER SYMPTOMS
ABDOMINAL PAIN: 0
SHORTNESS OF BREATH: 0
COUGH: 0

## 2022-08-15 ASSESSMENT — PATIENT HEALTH QUESTIONNAIRE - PHQ9
SUM OF ALL RESPONSES TO PHQ QUESTIONS 1-9: 0
2. FEELING DOWN, DEPRESSED OR HOPELESS: 0
1. LITTLE INTEREST OR PLEASURE IN DOING THINGS: 0
SUM OF ALL RESPONSES TO PHQ QUESTIONS 1-9: 0
SUM OF ALL RESPONSES TO PHQ9 QUESTIONS 1 & 2: 0

## 2022-08-15 NOTE — PROGRESS NOTES
PROGRESS NOTE    SUBJECTIVE:   Holli Grandchild is a 46 y.o. female seen for a follow up visit regarding the following chief complaint:     Chief Complaint   Patient presents with    Annual Exam    Discuss Labs    Lower Back Pain     Lower back pain with R sided sciatica           HPI patient presents office today for complete physical complaining of low back pain and follow-up of her MRI also states that Dr. Luzma Gutierrez does her Pap smear but she has not been over a year and a half with a history of DU B      Past Medical History, Past Surgical History, Family history, Social History, and Medications were all reviewed with the patient today and updated as necessary. Current Outpatient Medications   Medication Sig Dispense Refill    cyclobenzaprine (FLEXERIL) 10 MG tablet Take 1 tablet by mouth 2 times daily as needed for Muscle spasms 60 tablet 0    albuterol sulfate HFA (PROVENTIL;VENTOLIN;PROAIR) 108 (90 Base) MCG/ACT inhaler Inhale 2 puffs into the lungs every 4 hours as needed for Wheezing 18 g 5    fluticasone (FLONASE) 50 MCG/ACT nasal spray 1 spray by Nasal route in the morning. SPRAY 2 SPRAYS INTO EACH NOSTRIL EVERY DAY. 16 g 5    loratadine (CLARITIN) 10 MG tablet Take 1 tablet by mouth in the morning. TAKE 1 TABLET BY MOUTH EVERY DAY. 90 tablet 3    montelukast (SINGULAIR) 10 MG tablet Take 1 tablet by mouth nightly TAKE 1 TABLET BY MOUTH EVERY DAY 90 tablet 3    omeprazole (PRILOSEC) 40 MG delayed release capsule Take 1 capsule by mouth in the morning. 90 capsule 3    rosuvastatin (CRESTOR) 20 MG tablet Take 1 tablet by mouth in the morning. 90 tablet 3    naproxen (NAPROSYN) 500 MG tablet Take 1 tablet by mouth in the morning and 1 tablet in the evening. Take with meals.  60 tablet 0    Coenzyme Q10 10 MG CAPS 1 p.o. daily for prevention of myalgias due to statins      neomycin-polymyxin-hydrocortisone (CORTISPORIN) 3.5-78144-7 otic suspension Apply 2 drops to affected ear 4 times a day       No current facility-administered medications for this visit. Allergies   Allergen Reactions    Azithromycin Hives     Patient Active Problem List   Diagnosis    History of uterine fibroid    LPRD (laryngopharyngeal reflux disease)    Fibroids    Chronic pelvic pain in female    Vaginal bleeding    Disorder of vocal cords    Family history of ischemic heart disease (IHD)    Vulvovaginal candidiasis    Hot flashes due to menopause    Dyspareunia in female    Environmental and seasonal allergies    Bacterial vaginosis    Fibromyalgia    Precordial pain    Pure hypercholesterolemia    Vocal cord dysfunction    Mild persistent asthma with acute exacerbation    Hemoptysis    Hyperplastic polyp of ascending colon    Vasomotor rhinitis    Liver hemangioma     Past Medical History:   Diagnosis Date    GERD (gastroesophageal reflux disease)     Pure hypercholesterolemia 5/3/2016    Vocal cord dysfunction      Past Surgical History:   Procedure Laterality Date    BRONCHOSCOPY  09/2016    for hemoptysis--negative    CHOLECYSTECTOMY      COLONOSCOPY N/A 6/17/2021    COLONOSCOPY/ BMI 35 performed by Colin Berkowitz MD at [facility] ENDOSCOPY    COLONOSCOPY,MICHAEL MORALES/CAUTERY  6/20/2021         GYN      tubal ligation    LARYNGECTOMY  08/26/15    ROTATOR CUFF REPAIR Right      Family History   Problem Relation Age of Onset    Coronary Art Dis Mother     Coronary Art Dis Father      Social History     Tobacco Use    Smoking status: Never     Passive exposure: Never    Smokeless tobacco: Never   Substance Use Topics    Alcohol use: Not Currently     Alcohol/week: 0.0 standard drinks         Review of Systems   Constitutional:  Negative for chills and fever. Respiratory:  Negative for cough and shortness of breath. Cardiovascular:  Negative for chest pain. Gastrointestinal:  Negative for abdominal pain. Endocrine: Negative for cold intolerance and heat intolerance.    Genitourinary:  Negative for difficulty urinating, frequency, hematuria, pelvic pain, vaginal bleeding, vaginal discharge and vaginal pain. Neurological:  Negative for headaches. Psychiatric/Behavioral: Negative. OBJECTIVE:  /80 (Site: Left Upper Arm, Position: Sitting, Cuff Size: Small Adult)   Ht 5' 2\" (1.575 m)   Wt 187 lb (84.8 kg)   BMI 34.20 kg/m²      Physical Exam  Vitals and nursing note reviewed. Constitutional:       Appearance: Normal appearance. HENT:      Head: Normocephalic and atraumatic. Nose: Nose normal.      Mouth/Throat:      Mouth: Mucous membranes are moist.      Pharynx: No oropharyngeal exudate or posterior oropharyngeal erythema. Eyes:      Extraocular Movements: Extraocular movements intact. Conjunctiva/sclera: Conjunctivae normal.      Pupils: Pupils are equal, round, and reactive to light. Cardiovascular:      Rate and Rhythm: Normal rate and regular rhythm. Pulses: Normal pulses. Heart sounds: Normal heart sounds. Pulmonary:      Effort: Pulmonary effort is normal.      Breath sounds: Normal breath sounds. Abdominal:      General: Abdomen is flat. Bowel sounds are normal.      Palpations: Abdomen is soft. Genitourinary:     Comments: Deferred done by OB/GYN  Musculoskeletal:         General: Normal range of motion. Cervical back: Normal range of motion and neck supple. Skin:     General: Skin is warm. Capillary Refill: Capillary refill takes less than 2 seconds. Neurological:      General: No focal deficit present. Mental Status: She is alert and oriented to person, place, and time. Psychiatric:         Mood and Affect: Mood normal.         Behavior: Behavior normal.         Thought Content: Thought content normal.         Judgment: Judgment normal.        Medical problems and test results were reviewed with the patient today.      Recent Results (from the past 672 hour(s))   AMB POC URINALYSIS DIP STICK MANUAL W/O MICRO    Collection Time: 08/08/22 10:25 AM   Result Value Ref Range    Color (UA POC) Yellow     Clarity (UA POC) Clear     Glucose, Urine, POC Negative Negative    Bilirubin, Urine, POC Negative Negative    Ketones, Urine, POC Negative Negative    Specific Gravity, Urine, POC 1.025 1.001 - 1.035    Blood (UA POC) Negative Negative    pH, Urine, POC 5.5 4.6 - 8.0    Protein, Urine, POC Negative Negative    Urobilinogen, POC Normal     Nitrite, Urine, POC Negative Negative    Leukocyte Esterase, Urine, POC Negative Negative   Hepatitis C Antibody    Collection Time: 08/08/22 11:18 AM   Result Value Ref Range    Hepatitis C Ab NONREACTIVE NONREACTIVE     TSH    Collection Time: 08/08/22 11:18 AM   Result Value Ref Range    TSH, 3RD GENERATION 1.140 0.358 - 3.740 uIU/mL   Vitamin D 25 Hydroxy    Collection Time: 08/08/22 11:18 AM   Result Value Ref Range    Vit D, 25-Hydroxy 28.8 (L) 30.0 - 100.0 ng/mL   Lipid Panel    Collection Time: 08/08/22 11:18 AM   Result Value Ref Range    Cholesterol, Total 185 <200 MG/DL    Triglycerides 112 35 - 150 MG/DL    HDL 53 40 - 60 MG/DL    LDL Calculated 109.6 (H) <100 MG/DL    VLDL Cholesterol Calculated 22.4 6.0 - 23.0 MG/DL    Chol/HDL Ratio 3.5     Comprehensive Metabolic Panel    Collection Time: 08/08/22 11:18 AM   Result Value Ref Range    Sodium 140 136 - 145 mmol/L    Potassium 4.3 3.5 - 5.1 mmol/L    Chloride 111 (H) 98 - 107 mmol/L    CO2 25 21 - 32 mmol/L    Anion Gap 4 (L) 7 - 16 mmol/L    Glucose 100 65 - 100 mg/dL    BUN 17 6 - 23 MG/DL    Creatinine 0.70 0.6 - 1.0 MG/DL    GFR African American >60 >60 ml/min/1.73m2    GFR Non- >60 >60 ml/min/1.73m2    Calcium 9.0 8.3 - 10.4 MG/DL    Total Bilirubin 0.4 0.2 - 1.1 MG/DL    ALT 36 12 - 65 U/L    AST 20 15 - 37 U/L    Alk Phosphatase 85 50 - 136 U/L    Total Protein 7.2 6.3 - 8.2 g/dL    Albumin 3.9 3.5 - 5.0 g/dL    Globulin 3.3 2.3 - 3.5 g/dL    Albumin/Globulin Ratio 1.2 1.2 - 3.5     CBC with Auto Differential    Collection Time: 08/08/22 11:18 AM   Result Value Ref Range    WBC 6.1 4.3 - 11.1 K/uL    RBC 4.95 4.05 - 5.2 M/uL    Hemoglobin 14.7 11.7 - 15.4 g/dL    Hematocrit 46.2 35.8 - 46.3 %    MCV 93.3 79.6 - 97.8 FL    MCH 29.7 26.1 - 32.9 PG    MCHC 31.8 31.4 - 35.0 g/dL    RDW 12.8 11.9 - 14.6 %    Platelets 671 717 - 933 K/uL    MPV 13.0 (H) 9.4 - 12.3 FL    nRBC 0.00 0.0 - 0.2 K/uL    Differential Type AUTOMATED      Seg Neutrophils 47 43 - 78 %    Lymphocytes 42 13 - 44 %    Monocytes 7 4.0 - 12.0 %    Eosinophils % 3 0.5 - 7.8 %    Basophils 1 0.0 - 2.0 %    Immature Granulocytes 0 0.0 - 5.0 %    Segs Absolute 2.9 1.7 - 8.2 K/UL    Absolute Lymph # 2.6 0.5 - 4.6 K/UL    Absolute Mono # 0.4 0.1 - 1.3 K/UL    Absolute Eos # 0.2 0.0 - 0.8 K/UL    Basophils Absolute 0.0 0.0 - 0.2 K/UL    Absolute Immature Granulocyte 0.0 0.0 - 0.5 K/UL   HIV 1/2 Ag/Ab, 4TH Generation,W Rflx Confirm    Collection Time: 08/08/22 11:18 AM   Result Value Ref Range    HIV 1/2 Interp NONREACTIVE NONREACTIVE      HIV 1/2 Result Comment SEE NOTE         ASSESSMENT and PLAN    Visit Diagnoses and Associated Orders       Routine general medical examination at a health care facility    -  Primary    AMB POC URINALYSIS DIP STICK MANUAL W/O MICRO [64023 CPT(R)]   - Future Order    Hepatitis C Antibody [[id number] Custom]   - Future Order    TSH [[id number] Custom]   - Future Order    Vitamin D 25 Hydroxy [85767 Custom]   - Future Order    Lipid Panel [[id number] Custom]   - Future Order    Comprehensive Metabolic Panel [[id number] Custom]   - Future Order    AMB POC KTY COMPLETE CBC [76563 CPT(R)]   - Future Order    HIV 1/2 Ag/Ab, 4TH Generation,W Rflx Confirm [18900 CPT(R)]   - Future Order         Chronic right-sided low back pain with right-sided sciatica        cyclobenzaprine (FLEXERIL) 10 MG tablet [2017]      naproxen (NAPROSYN) 500 MG tablet [5393]      [facility] [[id number] Custom]           Gastro-esophageal reflux disease without esophagitis        omeprazole (PRILOSEC) 40 MG delayed release capsule [04148]           Familial hypercholesterolemia        rosuvastatin (CRESTOR) 20 MG tablet [92283]           Other allergic rhinitis        fluticasone (FLONASE) 50 MCG/ACT nasal spray [68462]      loratadine (CLARITIN) 10 MG tablet [54928]      montelukast (SINGULAIR) 10 MG tablet [99750]           Mild persistent asthma with (acute) exacerbation        albuterol sulfate HFA (PROVENTIL;VENTOLIN;PROAIR) 108 (90 Base) MCG/ACT inhaler [01111]           Screening for depression             Screening for osteoporosis        DEXA BONE DENSITY AXIAL SKELETON [31731 Custom]   - Future Order         Postmenopausal        DEXA BONE DENSITY AXIAL SKELETON [[id number] Custom]   - Future Order         Screening mammogram for high-risk patient        KRISTOPHER DIGITAL SCREEN W OR WO CAD BILATERAL [[id number] Custom]   - Future Order         DUB (dysfunctional uterine bleeding)        Natalie Tristan MD, OB/GYN, [facility] Custom]                       Diagnosis Orders   1. Routine general medical examination at a health care facility  AMB POC URINALYSIS DIP STICK MANUAL W/O MICRO    Hepatitis C Antibody    TSH    Vitamin D 25 Hydroxy    Lipid Panel    Comprehensive Metabolic Panel    AMB POC KTY COMPLETE CBC    HIV 1/2 Ag/Ab, 4TH Generation,W Rflx Confirm      2. Chronic right-sided low back pain with right-sided sciatica  cyclobenzaprine (FLEXERIL) 10 MG tablet    naproxen (NAPROSYN) 500 MG tablet    [facility]      3. Gastro-esophageal reflux disease without esophagitis  omeprazole (PRILOSEC) 40 MG delayed release capsule      4. Familial hypercholesterolemia  rosuvastatin (CRESTOR) 20 MG tablet      5. Other allergic rhinitis  fluticasone (FLONASE) 50 MCG/ACT nasal spray    loratadine (CLARITIN) 10 MG tablet    montelukast (SINGULAIR) 10 MG tablet      6.  Mild persistent asthma with (acute) exacerbation  albuterol sulfate HFA (PROVENTIL;VENTOLIN;PROAIR) 108 (90 Base) MCG/ACT inhaler      7. Screening for depression        8. Screening for osteoporosis  DEXA BONE DENSITY AXIAL SKELETON      9. Postmenopausal  DEXA BONE DENSITY AXIAL SKELETON      10. Screening mammogram for high-risk patient  KRISTOPHER DIGITAL SCREEN W OR WO CAD BILATERAL      11. DUB (dysfunctional uterine bleeding)  Courtney Russell MD, OB/GYN, [facility] was seen today for annual exam, discuss labs and lower back pain. Diagnoses and all orders for this visit:    Routine general medical examination at a health care facility  -     AMB POC URINALYSIS DIP STICK MANUAL W/O MICRO; Future  -     Hepatitis C Antibody; Future  -     TSH; Future  -     Vitamin D 25 Hydroxy; Future  -     Lipid Panel; Future  -     Comprehensive Metabolic Panel; Future  -     AMB POC KTY COMPLETE CBC; Future  -     HIV 1/2 Ag/Ab, 4TH Generation,W Rflx Confirm; Future    Chronic right-sided low back pain with right-sided sciatica  -     cyclobenzaprine (FLEXERIL) 10 MG tablet; Take 1 tablet by mouth 2 times daily as needed for Muscle spasms  -     naproxen (NAPROSYN) 500 MG tablet; Take 1 tablet by mouth in the morning and 1 tablet in the evening. Take with meals.  -     [facility]    Gastro-esophageal reflux disease without esophagitis  -     omeprazole (PRILOSEC) 40 MG delayed release capsule; Take 1 capsule by mouth in the morning. Familial hypercholesterolemia  -     rosuvastatin (CRESTOR) 20 MG tablet; Take 1 tablet by mouth in the morning. Other allergic rhinitis  -     fluticasone (FLONASE) 50 MCG/ACT nasal spray; 1 spray by Nasal route in the morning. SPRAY 2 SPRAYS INTO EACH NOSTRIL EVERY DAY. -     loratadine (CLARITIN) 10 MG tablet; Take 1 tablet by mouth in the morning. TAKE 1 TABLET BY MOUTH EVERY DAY. -     montelukast (SINGULAIR) 10 MG tablet;  Take 1 tablet by mouth nightly TAKE 1 TABLET BY MOUTH EVERY DAY    Mild persistent asthma with (acute) exacerbation  -     albuterol sulfate HFA (PROVENTIL;VENTOLIN;PROAIR) 108 (90 Base) MCG/ACT inhaler; Inhale 2 puffs into the lungs every 4 hours as needed for Wheezing    Screening for depression    Screening for osteoporosis  -     DEXA BONE DENSITY AXIAL SKELETON; Future    Postmenopausal  -     DEXA BONE DENSITY AXIAL SKELETON; Future    Screening mammogram for high-risk patient  -     KRISTOPHER DIGITAL SCREEN W OR WO CAD BILATERAL; Future    DUB (dysfunctional uterine bleeding)  -     Vidal Luther MD, OB/GYN, [place]  , We will have her follow-up with Dr. Malcolm Pond for her Pap smear we will schedule her bone density and her mammogram refilled her medications answered all her questions we will continue her on Naprosyn and Flexeril until she seen by the neurosurgeon for further evaluation of the MRI which was reviewed with her. I have spent a total of 8-15 minutes assessing, reviewing, and discussing the depression screening with patient in office today.   See her next year for her physical

## 2022-08-19 ENCOUNTER — HOSPITAL ENCOUNTER (OUTPATIENT)
Dept: MAMMOGRAPHY | Age: 52
Discharge: HOME | End: 2022-08-22
Payer: COMMERCIAL

## 2022-08-19 VITALS — WEIGHT: 186 LBS | HEIGHT: 63 IN | BODY MASS INDEX: 32.96 KG/M2

## 2022-08-19 DIAGNOSIS — Z13.820 SCREENING FOR OSTEOPOROSIS: ICD-10-CM

## 2022-08-19 DIAGNOSIS — Z78.0 POSTMENOPAUSAL: ICD-10-CM

## 2022-08-19 PROCEDURE — 77080 DXA BONE DENSITY AXIAL: CPT

## 2022-08-22 ENCOUNTER — OFFICE VISIT (OUTPATIENT)
Dept: FAMILY MEDICINE CLINIC | Facility: CLINIC | Age: 52
End: 2022-08-22
Payer: COMMERCIAL

## 2022-08-22 VITALS
DIASTOLIC BLOOD PRESSURE: 80 MMHG | BODY MASS INDEX: 33.66 KG/M2 | SYSTOLIC BLOOD PRESSURE: 130 MMHG | WEIGHT: 190 LBS | HEIGHT: 63 IN

## 2022-08-22 DIAGNOSIS — R79.89 LOW VITAMIN D LEVEL: Primary | ICD-10-CM

## 2022-08-22 DIAGNOSIS — M85.80 OSTEOPENIA, UNSPECIFIED LOCATION: ICD-10-CM

## 2022-08-22 PROCEDURE — 99213 OFFICE O/P EST LOW 20 MIN: CPT | Performed by: FAMILY MEDICINE

## 2022-08-22 RX ORDER — ERGOCALCIFEROL 1.25 MG/1
50000 CAPSULE ORAL WEEKLY
Qty: 4 CAPSULE | Refills: 11 | Status: SHIPPED | OUTPATIENT
Start: 2022-08-22

## 2022-08-22 RX ORDER — ANTACID TABLETS 648 MG/1
1 TABLET, CHEWABLE ORAL 2 TIMES DAILY
Qty: 60 TABLET | Refills: 11 | Status: SHIPPED | OUTPATIENT
Start: 2022-08-22 | End: 2023-08-22

## 2022-08-22 ASSESSMENT — ENCOUNTER SYMPTOMS
VOMITING: 0
SHORTNESS OF BREATH: 0
BACK PAIN: 1
NAUSEA: 0

## 2022-08-22 ASSESSMENT — PATIENT HEALTH QUESTIONNAIRE - PHQ9
1. LITTLE INTEREST OR PLEASURE IN DOING THINGS: 0
2. FEELING DOWN, DEPRESSED OR HOPELESS: 0
SUM OF ALL RESPONSES TO PHQ QUESTIONS 1-9: 0
SUM OF ALL RESPONSES TO PHQ QUESTIONS 1-9: 0
SUM OF ALL RESPONSES TO PHQ9 QUESTIONS 1 & 2: 0
SUM OF ALL RESPONSES TO PHQ QUESTIONS 1-9: 0
SUM OF ALL RESPONSES TO PHQ QUESTIONS 1-9: 0

## 2022-08-22 NOTE — PROGRESS NOTES
PROGRESS NOTE    SUBJECTIVE:   Justin Barrett is a 46 y.o. female seen for a follow up visit regarding the following chief complaint:     Chief Complaint   Patient presents with    Discuss Labs     DEXA scan    Lower Back Pain     Chronic lower back pain with R sided sciatica           HPI  Presents office for follow-up of her DEXA complaining of low back pain still has not heard referral from a \"specialist\"    Past Medical History, Past Surgical History, Family history, Social History, and Medications were all reviewed with the patient today and updated as necessary. Current Outpatient Medications   Medication Sig Dispense Refill    vitamin D (ERGOCALCIFEROL) 1.25 MG (03241 UT) CAPS capsule Take 1 capsule by mouth once a week 4 capsule 11    calcium carbonate 648 MG TABS Take 1 tablet by mouth 2 times daily 60 tablet 11    cyclobenzaprine (FLEXERIL) 10 MG tablet Take 1 tablet by mouth 2 times daily as needed for Muscle spasms 60 tablet 0    albuterol sulfate HFA (PROVENTIL;VENTOLIN;PROAIR) 108 (90 Base) MCG/ACT inhaler Inhale 2 puffs into the lungs every 4 hours as needed for Wheezing 18 g 5    fluticasone (FLONASE) 50 MCG/ACT nasal spray 1 spray by Nasal route in the morning. SPRAY 2 SPRAYS INTO EACH NOSTRIL EVERY DAY. 16 g 5    loratadine (CLARITIN) 10 MG tablet Take 1 tablet by mouth in the morning. TAKE 1 TABLET BY MOUTH EVERY DAY. 90 tablet 3    montelukast (SINGULAIR) 10 MG tablet Take 1 tablet by mouth nightly TAKE 1 TABLET BY MOUTH EVERY DAY 90 tablet 3    omeprazole (PRILOSEC) 40 MG delayed release capsule Take 1 capsule by mouth in the morning. 90 capsule 3    rosuvastatin (CRESTOR) 20 MG tablet Take 1 tablet by mouth in the morning. 90 tablet 3    naproxen (NAPROSYN) 500 MG tablet Take 1 tablet by mouth in the morning and 1 tablet in the evening. Take with meals.  60 tablet 0    Coenzyme Q10 10 MG CAPS 1 p.o. daily for prevention of myalgias due to statins      neomycin-polymyxin-hydrocortisone (CORTISPORIN) 3.5-84069-3 otic suspension Apply 2 drops to affected ear 4 times a day       No current facility-administered medications for this visit. Allergies   Allergen Reactions    Azithromycin Hives     Patient Active Problem List   Diagnosis    History of uterine fibroid    LPRD (laryngopharyngeal reflux disease)    Fibroids    Chronic pelvic pain in female    Vaginal bleeding    Disorder of vocal cords    Family history of ischemic heart disease (IHD)    Vulvovaginal candidiasis    Hot flashes due to menopause    Dyspareunia in female    Environmental and seasonal allergies    Bacterial vaginosis    Fibromyalgia    Precordial pain    Pure hypercholesterolemia    Vocal cord dysfunction    Mild persistent asthma with acute exacerbation    Hemoptysis    Hyperplastic polyp of ascending colon    Vasomotor rhinitis    Liver hemangioma     Past Medical History:   Diagnosis Date    GERD (gastroesophageal reflux disease)     Pure hypercholesterolemia 5/3/2016    Vocal cord dysfunction      Past Surgical History:   Procedure Laterality Date    BRONCHOSCOPY  09/2016    for hemoptysis--negative    CHOLECYSTECTOMY      COLONOSCOPY N/A 6/17/2021    COLONOSCOPY/ BMI 35 performed by Leelee Kumar MD at [facility] ENDOSCOPY    COLONOSCOPY,JERSEY GARCIAFORCEP/CAUTERY  6/20/2021         GYN      tubal ligation    LARYNGECTOMY  08/26/15    ROTATOR CUFF REPAIR Right      Family History   Problem Relation Age of Onset    Coronary Art Dis Mother     Coronary Art Dis Father      Social History     Tobacco Use    Smoking status: Never     Passive exposure: Never    Smokeless tobacco: Never   Substance Use Topics    Alcohol use: Not Currently     Alcohol/week: 0.0 standard drinks         Review of Systems   Constitutional:  Negative for fatigue and fever. Respiratory:  Negative for shortness of breath. Cardiovascular:  Negative for chest pain. Gastrointestinal:  Negative for nausea and vomiting.    Musculoskeletal:  Positive for back pain. OBJECTIVE:  /80 (Site: Left Upper Arm, Position: Sitting, Cuff Size: Small Adult)   Ht 5' 3\" (1.6 m)   Wt 190 lb (86.2 kg)   BMI 33.66 kg/m²      Physical Exam  Musculoskeletal:      Lumbar back: Tenderness present. Decreased range of motion. Medical problems and test results were reviewed with the patient today.      Recent Results (from the past 672 hour(s))   AMB POC URINALYSIS DIP STICK MANUAL W/O MICRO    Collection Time: 08/08/22 10:25 AM   Result Value Ref Range    Color (UA POC) Yellow     Clarity (UA POC) Clear     Glucose, Urine, POC Negative Negative    Bilirubin, Urine, POC Negative Negative    Ketones, Urine, POC Negative Negative    Specific Gravity, Urine, POC 1.025 1.001 - 1.035    Blood (UA POC) Negative Negative    pH, Urine, POC 5.5 4.6 - 8.0    Protein, Urine, POC Negative Negative    Urobilinogen, POC Normal     Nitrite, Urine, POC Negative Negative    Leukocyte Esterase, Urine, POC Negative Negative   Hepatitis C Antibody    Collection Time: 08/08/22 11:18 AM   Result Value Ref Range    Hepatitis C Ab NONREACTIVE NONREACTIVE     TSH    Collection Time: 08/08/22 11:18 AM   Result Value Ref Range    TSH, 3RD GENERATION 1.140 0.358 - 3.740 uIU/mL   Vitamin D 25 Hydroxy    Collection Time: 08/08/22 11:18 AM   Result Value Ref Range    Vit D, 25-Hydroxy 28.8 (L) 30.0 - 100.0 ng/mL   Lipid Panel    Collection Time: 08/08/22 11:18 AM   Result Value Ref Range    Cholesterol, Total 185 <200 MG/DL    Triglycerides 112 35 - 150 MG/DL    HDL 53 40 - 60 MG/DL    LDL Calculated 109.6 (H) <100 MG/DL    VLDL Cholesterol Calculated 22.4 6.0 - 23.0 MG/DL    Chol/HDL Ratio 3.5     Comprehensive Metabolic Panel    Collection Time: 08/08/22 11:18 AM   Result Value Ref Range    Sodium 140 136 - 145 mmol/L    Potassium 4.3 3.5 - 5.1 mmol/L    Chloride 111 (H) 98 - 107 mmol/L    CO2 25 21 - 32 mmol/L    Anion Gap 4 (L) 7 - 16 mmol/L    Glucose 100 65 - 100 mg/dL    BUN 17 6 - 23 MG/DL Creatinine 0.70 0.6 - 1.0 MG/DL    GFR African American >60 >60 ml/min/1.73m2    GFR Non- >60 >60 ml/min/1.73m2    Calcium 9.0 8.3 - 10.4 MG/DL    Total Bilirubin 0.4 0.2 - 1.1 MG/DL    ALT 36 12 - 65 U/L    AST 20 15 - 37 U/L    Alk Phosphatase 85 50 - 136 U/L    Total Protein 7.2 6.3 - 8.2 g/dL    Albumin 3.9 3.5 - 5.0 g/dL    Globulin 3.3 2.3 - 3.5 g/dL    Albumin/Globulin Ratio 1.2 1.2 - 3.5     CBC with Auto Differential    Collection Time: 08/08/22 11:18 AM   Result Value Ref Range    WBC 6.1 4.3 - 11.1 K/uL    RBC 4.95 4.05 - 5.2 M/uL    Hemoglobin 14.7 11.7 - 15.4 g/dL    Hematocrit 46.2 35.8 - 46.3 %    MCV 93.3 79.6 - 97.8 FL    MCH 29.7 26.1 - 32.9 PG    MCHC 31.8 31.4 - 35.0 g/dL    RDW 12.8 11.9 - 14.6 %    Platelets 660 399 - 858 K/uL    MPV 13.0 (H) 9.4 - 12.3 FL    nRBC 0.00 0.0 - 0.2 K/uL    Differential Type AUTOMATED      Seg Neutrophils 47 43 - 78 %    Lymphocytes 42 13 - 44 %    Monocytes 7 4.0 - 12.0 %    Eosinophils % 3 0.5 - 7.8 %    Basophils 1 0.0 - 2.0 %    Immature Granulocytes 0 0.0 - 5.0 %    Segs Absolute 2.9 1.7 - 8.2 K/UL    Absolute Lymph # 2.6 0.5 - 4.6 K/UL    Absolute Mono # 0.4 0.1 - 1.3 K/UL    Absolute Eos # 0.2 0.0 - 0.8 K/UL    Basophils Absolute 0.0 0.0 - 0.2 K/UL    Absolute Immature Granulocyte 0.0 0.0 - 0.5 K/UL   HIV 1/2 Ag/Ab, 4TH Generation,W Rflx Confirm    Collection Time: 08/08/22 11:18 AM   Result Value Ref Range    HIV 1/2 Interp NONREACTIVE NONREACTIVE      HIV 1/2 Result Comment SEE NOTE         ASSESSMENT and PLAN    Visit Diagnoses and Associated Orders       Low vitamin D level    -  Primary    vitamin D (ERGOCALCIFEROL) 1.25 MG (91126 UT) CAPS capsule [[id number]]           Osteopenia, unspecified location        vitamin D (ERGOCALCIFEROL) 1.25 MG (88541 UT) CAPS capsule [[id number]]      calcium carbonate 648 MG TABS [51128]                       Diagnosis Orders   1.  Low vitamin D level  vitamin D (ERGOCALCIFEROL) 1.25 MG (87962 UT) CAPS capsule      2. Osteopenia, unspecified location  vitamin D (ERGOCALCIFEROL) 1.25 MG (61977 UT) CAPS capsule    calcium carbonate 648 MG TABS      , Emily was seen today for discuss labs and lower back pain. Diagnoses and all orders for this visit:    Low vitamin D level  -     vitamin D (ERGOCALCIFEROL) 1.25 MG (22239 UT) CAPS capsule; Take 1 capsule by mouth once a week    Osteopenia, unspecified location  -     vitamin D (ERGOCALCIFEROL) 1.25 MG (85167 UT) CAPS capsule;  Take 1 capsule by mouth once a week  -     calcium carbonate 648 MG TABS; Take 1 tablet by mouth 2 times daily    , Discussed her results of her DEXA scan which came back showing she has osteopenia we will start her and maximize her calcium and vitamin D and recheck in 1 to 2 years we will find out where her referral is for her back pain

## 2022-08-23 ENCOUNTER — CLINICAL DOCUMENTATION (OUTPATIENT)
Dept: NEUROSURGERY | Age: 52
End: 2022-08-23

## 2022-08-31 ENCOUNTER — OFFICE VISIT (OUTPATIENT)
Dept: NEUROSURGERY | Age: 52
End: 2022-08-31
Payer: COMMERCIAL

## 2022-08-31 VITALS
DIASTOLIC BLOOD PRESSURE: 79 MMHG | SYSTOLIC BLOOD PRESSURE: 119 MMHG | TEMPERATURE: 97.7 F | BODY MASS INDEX: 33.66 KG/M2 | OXYGEN SATURATION: 98 % | WEIGHT: 190 LBS | HEART RATE: 73 BPM | HEIGHT: 63 IN

## 2022-08-31 DIAGNOSIS — M25.551 RIGHT HIP PAIN: ICD-10-CM

## 2022-08-31 DIAGNOSIS — M51.26 PROTRUSION OF LUMBAR INTERVERTEBRAL DISC: Primary | ICD-10-CM

## 2022-08-31 DIAGNOSIS — M54.31 SCIATICA OF RIGHT SIDE: ICD-10-CM

## 2022-08-31 PROCEDURE — 99203 OFFICE O/P NEW LOW 30 MIN: CPT | Performed by: NEUROLOGICAL SURGERY

## 2022-08-31 NOTE — PROGRESS NOTES
[facility] NOTE:   History of Present Illness:    CC: Right lower extremity pain and discomfort    Julio Cesar Guerin is a 46 y.o. female who presents today for evaluation of right lower extremity pain and discomfort. The patient states she fell a year ago and since that time has been having right lower extremity pain and discomfort. She states that she gets some occasional unsteadiness and probably gait and balance. She has been working with physical therapy and has not undergone 12 sessions. She has taken oral steroids which helped while she is taking medication but once she is no longer taking them the pain returns. She current rates her pain as a 5 out of 10 on a visual analog pain scale. History and physical examination is obtained with the assistance of a  via telemedicine service. She has not undergone any epidural steroid injections. The patient has an MRI lumbar spine without contrast performed at [address] [place] [address] on 8/10/2022 that demonstrates well-preserved lumbar lordosis there is no evidence of significant central spinal stenosis. There is a left foraminal disc protrusion at L4-L5 on the left that results in some moderate to severe left neuroforaminal stenosis.     Past Medical History:   Diagnosis Date    GERD (gastroesophageal reflux disease)     Pure hypercholesterolemia 5/3/2016    Vocal cord dysfunction      Past Surgical History:   Procedure Laterality Date    BRONCHOSCOPY  09/2016    for hemoptysis--negative    CHOLECYSTECTOMY      COLONOSCOPY N/A 6/17/2021    COLONOSCOPY/ BMI 35 performed by Magalis Puente MD at [facility] ENDOSCOPY    COLONOSCOPY,MICHAEL MORALES/CAUTERY  6/20/2021         GYN      tubal ligation    LARYNGECTOMY  08/26/15    ROTATOR CUFF REPAIR Right      Allergies   Allergen Reactions    Azithromycin Hives      Family History   Problem Relation Age of Onset    Coronary Art Dis Mother     Coronary Art Dis Father       Social History     Socioeconomic History    Marital status:      Spouse name: Not on file    Number of children: Not on file    Years of education: Not on file    Highest education level: Not on file   Occupational History    Not on file   Tobacco Use    Smoking status: Never     Passive exposure: Never    Smokeless tobacco: Never   Vaping Use    Vaping Use: Never used   Substance and Sexual Activity    Alcohol use: Not Currently     Alcohol/week: 0.0 standard drinks    Drug use: No    Sexual activity: Not on file   Other Topics Concern    Not on file   Social History Narrative     and lives with . Has lived in [place] and [place]. Has one dog. Previously worked as a .       [facility] Strain: Not on [employer] Insecurity: Not on file   Transportation Needs: Not on file   Physical Activity: Not on file   Stress: Not on file   Social Connections: Not on file   Intimate Partner Violence: Not on file   Housing Stability: Not on file     Current Outpatient Medications   Medication Sig Dispense Refill    vitamin D (ERGOCALCIFEROL) 1.25 MG (22417 UT) CAPS capsule Take 1 capsule by mouth once a week 4 capsule 11    calcium carbonate 648 MG TABS Take 1 tablet by mouth 2 times daily 60 tablet 11    cyclobenzaprine (FLEXERIL) 10 MG tablet Take 1 tablet by mouth 2 times daily as needed for Muscle spasms 60 tablet 0    albuterol sulfate HFA (PROVENTIL;VENTOLIN;PROAIR) 108 (90 Base) MCG/ACT inhaler Inhale 2 puffs into the lungs every 4 hours as needed for Wheezing 18 g 5    fluticasone (FLONASE) 50 MCG/ACT nasal spray 1 spray by Nasal route in the morning. SPRAY 2 SPRAYS INTO EACH NOSTRIL EVERY DAY. 16 g 5    loratadine (CLARITIN) 10 MG tablet Take 1 tablet by mouth in the morning. TAKE 1 TABLET BY MOUTH EVERY DAY.  90 tablet 3    montelukast (SINGULAIR) 10 MG tablet Take 1 tablet by mouth nightly TAKE 1 TABLET BY MOUTH EVERY DAY 90 tablet 3 omeprazole (PRILOSEC) 40 MG delayed release capsule Take 1 capsule by mouth in the morning. 90 capsule 3    rosuvastatin (CRESTOR) 20 MG tablet Take 1 tablet by mouth in the morning. 90 tablet 3    naproxen (NAPROSYN) 500 MG tablet Take 1 tablet by mouth in the morning and 1 tablet in the evening. Take with meals. 60 tablet 0    Coenzyme Q10 10 MG CAPS 1 p.o. daily for prevention of myalgias due to statins      neomycin-polymyxin-hydrocortisone (CORTISPORIN) 3.5-16808-8 otic suspension Apply 2 drops to affected ear 4 times a day       No current facility-administered medications for this visit.      Patient Active Problem List   Diagnosis    History of uterine fibroid    LPRD (laryngopharyngeal reflux disease)    Fibroids    Chronic pelvic pain in female    Vaginal bleeding    Disorder of vocal cords    Family history of ischemic heart disease (IHD)    Vulvovaginal candidiasis    Hot flashes due to menopause    Dyspareunia in female    Environmental and seasonal allergies    Bacterial vaginosis    Fibromyalgia    Precordial pain    Pure hypercholesterolemia    Vocal cord dysfunction    Mild persistent asthma with acute exacerbation    Hemoptysis    Hyperplastic polyp of ascending colon    Vasomotor rhinitis    Liver hemangioma        Review of Systems      Physical Exam:  /79   Pulse 73   Temp 97.7 °F (36.5 °C) (Temporal)   Ht 5' 3\" (1.6 m)   Wt 190 lb (86.2 kg)   SpO2 98%   BMI 33.66 kg/m²   Pain Score:   5  Head normocephalic and atraumatic  Mood and affect appropriate  General: No acute distress  CV: Regular rate  Resp: No increased work of breathing  Skin: warm and dry  Awake, alert, and oriented   Speech Fluent  Eyes open spontaneously   Face symmetric and tongue midline on protrusion  Sternocleidomastoid and trapezius strength 5/5  No mid-line cervical, thoracic, or lumbar tenderness to palpation   Patient with strength exam as follows:   Upper Extremities: Right Left      Deltoid  5 5    Biceps  5 5    Triceps             5 5      5 5     Lower Extremities:      Hip Flex 5 5    Quads  5 5    Hamstrings 5 5    Dorsiflex 5 5    Plantarflex 5 5    EHL  5 5  Sensation grossly intact to light touch  DTR 2+  No clonus   Negative straight leg raise test on the right and left  Patient with some point tenderness over the right greater trochanter  Gait: normal nonantalgic gait, stands from a seated position without difficulty and ambulates independently    Assessment & Plan:  Zabrina Suazo is a 46 y.o. female who presents today for evaluation of right lower extremity pain and discomfort. I have independently reviewed and interpreted the patient's MRI lumbar spine without contrast performed at [address] [place] [address] on 8/10/2022 that demonstrates well-preserved lumbar lordosis there is no evidence of significant central spinal stenosis. There is a left foraminal disc protrusion at L4-L5 on the left that results in some moderate to severe left neuroforaminal stenosis. At this time given the patient's neuroforaminal stenosis at L4-L5 secondary to a foraminal disc protrusion would recommend a left-sided L4-L5 transforaminal epidural steroid injection. Should she fail a left-sided L4-L5 transforaminal epidural steroid injection and failed conservative measures she would likely benefit from a left-sided L4-L5 far lateral discectomy and foraminotomy. At this time I see no acute compressive pathology on the right side that correlates with her right lower extremity pain. She does have some pain and tenderness over the right greater [place] which she may be experiencing some iliotibial band syndrome. I do not see any acute compressive pathology that requires neurosurgical intervention at this time. I will send her to [facility] for consideration of possible epidural steroid injections or paraspinal blocks. We will see her on an as-needed basis. No scheduled neurosurgical follow-up is necessary at this time. ICD-10-CM    1. Protrusion of lumbar intervertebral disc  M51.26       2. Sciatica of right side  M54.31       3. Right hip pain  M25.551         Andrea Tiwari, [address] [place] [address]     Notes are transcribed with [address] [place] [address], a medical voice recording dictation service, and may contain minor errors.

## 2022-09-12 ENCOUNTER — OFFICE VISIT (OUTPATIENT)
Dept: OBGYN CLINIC | Age: 52
End: 2022-09-12
Payer: COMMERCIAL

## 2022-09-12 VITALS
HEIGHT: 63 IN | SYSTOLIC BLOOD PRESSURE: 120 MMHG | BODY MASS INDEX: 33.91 KG/M2 | DIASTOLIC BLOOD PRESSURE: 70 MMHG | WEIGHT: 191.4 LBS

## 2022-09-12 DIAGNOSIS — Z12.4 SCREENING FOR CERVICAL CANCER: ICD-10-CM

## 2022-09-12 DIAGNOSIS — Z12.11 SCREENING FOR COLON CANCER: Primary | ICD-10-CM

## 2022-09-12 DIAGNOSIS — Z12.31 ENCOUNTER FOR SCREENING MAMMOGRAM FOR MALIGNANT NEOPLASM OF BREAST: ICD-10-CM

## 2022-09-12 DIAGNOSIS — Z01.419 WELL WOMAN EXAM: Primary | ICD-10-CM

## 2022-09-12 PROCEDURE — 99396 PREV VISIT EST AGE 40-64: CPT | Performed by: OBSTETRICS & GYNECOLOGY

## 2022-09-12 RX ORDER — ESTRADIOL 2 MG/1
2 TABLET ORAL DAILY
Qty: 90 TABLET | Refills: 3 | Status: SHIPPED | OUTPATIENT
Start: 2022-09-12

## 2022-09-12 RX ORDER — MEDROXYPROGESTERONE ACETATE 5 MG/1
5 TABLET ORAL DAILY
Qty: 93 TABLET | Refills: 3 | Status: SHIPPED | OUTPATIENT
Start: 2022-09-12

## 2022-09-12 NOTE — PROGRESS NOTES
for prevention of myalgias due to statins      neomycin-polymyxin-hydrocortisone (CORTISPORIN) 3.5-36712-4 otic suspension Apply 2 drops to affected ear 4 times a day       No current facility-administered medications on file prior to visit. SEE UPDATED LIST  Allergies   Allergen Reactions    Azithromycin Hives   SEE LIST  Social History     Tobacco Use    Smoking status: Never     Passive exposure: Never    Smokeless tobacco: Never   Substance Use Topics    Alcohol use: Not Currently     Alcohol/week: 0.0 standard drinks      Family History   Problem Relation Age of Onset    Coronary Art Dis Mother     Coronary Art Dis Father      OBGYN History:             Physical Exam  Blood pressure 120/70, height 5' 3\" (1.6 m), weight 191 lb 6.4 oz (86.8 kg). Body mass index is 33.9 kg/m². Lab Results   Component Value Date/Time    HGB 14.7 08/08/2022 11:18 AM      @LASTPROCAMB([id number])@  No results found for: [place], HCGQR, THCGA1    HEENT unremarkable. Sclera non-icteric. Neck is supple without thyromegaly or nodes. Chest clear to auscultation. Heart regular rate and rhythm with no murmur, Breast exam reveals no masses or nipple discharge. No axillary notes are palpable. Abdomen is benign. BUS is normal.  Cervix IF  present. Pap smeaR performed. PRN  Bimanual exam reveals no masses. Assessment  46 y.o. No obstetric history on file. for annual exam.  Encounter Diagnoses   Name Primary? Screening for cervical cancer Yes    Encounter for screening mammogram for malignant neoplasm of breast        Plan  Orders Placed This Encounter   Procedures    KRISTOPHER DIGITAL SCREEN W OR WO CAD BILATERAL     Standing Status:   Future     Standing Expiration Date:   11/12/2023    PAP LB, Reflex HPV ASCUS     Standing Status:   Future     Standing Expiration Date:   9/12/2023     Order Specific Question:   Pap Source?           (Required)     Answer:   cervical     Order Specific Question:   Pap

## 2022-09-15 LAB
CYTOLOGIST CVX/VAG CYTO: NORMAL
CYTOLOGY CVX/VAG DOC THIN PREP: NORMAL
HPV REFLEX: NORMAL
Lab: NORMAL
PATH REPORT.FINAL DX SPEC: NORMAL
STAT OF ADQ CVX/VAG CYTO-IMP: NORMAL

## 2022-10-03 ENCOUNTER — HOSPITAL ENCOUNTER (OUTPATIENT)
Dept: MAMMOGRAPHY | Age: 52
Discharge: HOME | End: 2022-10-06
Payer: COMMERCIAL

## 2022-10-03 DIAGNOSIS — Z12.31 SCREENING MAMMOGRAM FOR HIGH-RISK PATIENT: ICD-10-CM

## 2022-10-03 PROCEDURE — 77067 SCR MAMMO BI INCL CAD: CPT

## 2022-12-30 ENCOUNTER — OFFICE VISIT (OUTPATIENT)
Dept: FAMILY MEDICINE CLINIC | Facility: CLINIC | Age: 52
End: 2022-12-30
Payer: COMMERCIAL

## 2022-12-30 VITALS
BODY MASS INDEX: 32.78 KG/M2 | WEIGHT: 185 LBS | HEIGHT: 63 IN | DIASTOLIC BLOOD PRESSURE: 80 MMHG | SYSTOLIC BLOOD PRESSURE: 120 MMHG

## 2022-12-30 DIAGNOSIS — J01.11 ACUTE RECURRENT FRONTAL SINUSITIS: Primary | ICD-10-CM

## 2022-12-30 DIAGNOSIS — J45.31 MILD PERSISTENT ASTHMA WITH (ACUTE) EXACERBATION: ICD-10-CM

## 2022-12-30 DIAGNOSIS — R05.9 COUGH, UNSPECIFIED TYPE: ICD-10-CM

## 2022-12-30 DIAGNOSIS — B00.1 HERPES LABIALIS: ICD-10-CM

## 2022-12-30 PROCEDURE — 99214 OFFICE O/P EST MOD 30 MIN: CPT | Performed by: FAMILY MEDICINE

## 2022-12-30 RX ORDER — ALBUTEROL SULFATE 90 UG/1
2 AEROSOL, METERED RESPIRATORY (INHALATION) EVERY 4 HOURS PRN
Qty: 18 G | Refills: 5 | Status: SHIPPED | OUTPATIENT
Start: 2022-12-30

## 2022-12-30 RX ORDER — AMOXICILLIN AND CLAVULANATE POTASSIUM 875; 125 MG/1; MG/1
1 TABLET, FILM COATED ORAL 2 TIMES DAILY
Qty: 14 TABLET | Refills: 0 | Status: SHIPPED | OUTPATIENT
Start: 2022-12-30 | End: 2023-01-06

## 2022-12-30 RX ORDER — HYDROCODONE BITARTRATE AND HOMATROPINE METHYLBROMIDE ORAL SOLUTION 5; 1.5 MG/5ML; MG/5ML
5 LIQUID ORAL EVERY 6 HOURS PRN
Qty: 140 ML | Refills: 0 | Status: SHIPPED | OUTPATIENT
Start: 2022-12-30 | End: 2023-01-06

## 2022-12-30 RX ORDER — VALACYCLOVIR HYDROCHLORIDE 1 G/1
2000 TABLET, FILM COATED ORAL 2 TIMES DAILY
Qty: 6 TABLET | Refills: 4 | Status: SHIPPED | OUTPATIENT
Start: 2022-12-30

## 2022-12-30 RX ORDER — METHYLPREDNISOLONE 4 MG/1
TABLET ORAL
Qty: 21 TABLET | Refills: 0 | Status: SHIPPED | OUTPATIENT
Start: 2022-12-30

## 2022-12-30 SDOH — ECONOMIC STABILITY: FOOD INSECURITY: WITHIN THE PAST 12 MONTHS, THE FOOD YOU BOUGHT JUST DIDN'T LAST AND YOU DIDN'T HAVE MONEY TO GET MORE.: NEVER TRUE

## 2022-12-30 SDOH — ECONOMIC STABILITY: FOOD INSECURITY: WITHIN THE PAST 12 MONTHS, YOU WORRIED THAT YOUR FOOD WOULD RUN OUT BEFORE YOU GOT MONEY TO BUY MORE.: NEVER TRUE

## 2022-12-30 ASSESSMENT — ENCOUNTER SYMPTOMS
SINUS PRESSURE: 0
VOMITING: 0
SORE THROAT: 0
COUGH: 1
VOICE CHANGE: 0
RHINORRHEA: 0
NAUSEA: 0
WHEEZING: 0

## 2022-12-30 ASSESSMENT — PATIENT HEALTH QUESTIONNAIRE - PHQ9
SUM OF ALL RESPONSES TO PHQ QUESTIONS 1-9: 0
SUM OF ALL RESPONSES TO PHQ QUESTIONS 1-9: 0
2. FEELING DOWN, DEPRESSED OR HOPELESS: 0
SUM OF ALL RESPONSES TO PHQ QUESTIONS 1-9: 0
1. LITTLE INTEREST OR PLEASURE IN DOING THINGS: 0
SUM OF ALL RESPONSES TO PHQ QUESTIONS 1-9: 0
SUM OF ALL RESPONSES TO PHQ9 QUESTIONS 1 & 2: 0

## 2022-12-30 ASSESSMENT — SOCIAL DETERMINANTS OF HEALTH (SDOH): HOW HARD IS IT FOR YOU TO PAY FOR THE VERY BASICS LIKE FOOD, HOUSING, MEDICAL CARE, AND HEATING?: NOT HARD AT ALL

## 2022-12-30 NOTE — PROGRESS NOTES
PROGRESS NOTE    SUBJECTIVE:   Emelia Rios is a 46 y.o. female seen for a follow up visit regarding the following chief complaint:     Chief Complaint   Patient presents with    Chest Congestion     10 days of productive cough with yellow sputum, chest congestion           HPI patient presents office complaining of 10-day history of cough congestion yellow-greenish phlegm wheezing history of COPD asthma complaining of a fever blister      Past Medical History, Past Surgical History, Family history, Social History, and Medications were all reviewed with the patient today and updated as necessary. Current Outpatient Medications   Medication Sig Dispense Refill    HYDROcodone homatropine (HYCODAN) 5-1.5 MG/5ML solution Take 5 mLs by mouth every 6 hours as needed (as needed for Cough) for up to 7 days. Max Daily Amount: 20 mLs 140 mL 0    amoxicillin-clavulanate (AUGMENTIN) 875-125 MG per tablet Take 1 tablet by mouth 2 times daily for 7 days 14 tablet 0    methylPREDNISolone (MEDROL, NATALIE,) 4 MG tablet Day 1:  Take 1 tablet 6 times daily, Day 2:  Take 1 tablet 5 times daily, Day 3:  Take 1 tablet 4 times daily, Day 4:  Take 1 tablet 3 times daily, Day 5:  Take 1 tablet 2 times daily, Day 6:  Take 1 tablet 1 time daily then stop.  21 tablet 0    valACYclovir (VALTREX) 1 g tablet Take 2 tablets by mouth 2 times daily 6 tablet 4    albuterol sulfate HFA (PROVENTIL;VENTOLIN;PROAIR) 108 (90 Base) MCG/ACT inhaler Inhale 2 puffs into the lungs every 4 hours as needed for Wheezing 18 g 5    estradiol (ESTRACE) 2 MG tablet Take 1 tablet by mouth daily 90 tablet 3    medroxyPROGESTERone (PROVERA) 5 MG tablet Take 1 tablet by mouth daily 93 tablet 3    vitamin D (ERGOCALCIFEROL) 1.25 MG (01470 UT) CAPS capsule Take 1 capsule by mouth once a week 4 capsule 11    calcium carbonate 648 MG TABS Take 1 tablet by mouth 2 times daily 60 tablet 11    cyclobenzaprine (FLEXERIL) 10 MG tablet Take 1 tablet by mouth 2 times daily as needed for Muscle spasms 60 tablet 0    fluticasone (FLONASE) 50 MCG/ACT nasal spray 1 spray by Nasal route in the morning. SPRAY 2 SPRAYS INTO EACH NOSTRIL EVERY DAY. 16 g 5    montelukast (SINGULAIR) 10 MG tablet Take 1 tablet by mouth nightly TAKE 1 TABLET BY MOUTH EVERY DAY 90 tablet 3    omeprazole (PRILOSEC) 40 MG delayed release capsule Take 1 capsule by mouth in the morning. 90 capsule 3    rosuvastatin (CRESTOR) 20 MG tablet Take 1 tablet by mouth in the morning. 90 tablet 3    Coenzyme Q10 10 MG CAPS 1 p.o. daily for prevention of myalgias due to statins      naproxen (NAPROSYN) 500 MG tablet Take 1 tablet by mouth in the morning and 1 tablet in the evening. Take with meals. 60 tablet 0    neomycin-polymyxin-hydrocortisone (CORTISPORIN) 3.5-63106-7 otic suspension Apply 2 drops to affected ear 4 times a day (Patient not taking: Reported on 12/30/2022)       No current facility-administered medications for this visit.      Allergies   Allergen Reactions    Azithromycin Hives     Patient Active Problem List   Diagnosis    History of uterine fibroid    LPRD (laryngopharyngeal reflux disease)    Fibroids    Chronic pelvic pain in female    Vaginal bleeding    Disorder of vocal cords    Family history of ischemic heart disease (IHD)    Vulvovaginal candidiasis    Hot flashes due to menopause    Dyspareunia in female    Environmental and seasonal allergies    Bacterial vaginosis    Fibromyalgia    Precordial pain    Pure hypercholesterolemia    Vocal cord dysfunction    Mild persistent asthma with acute exacerbation    Hemoptysis    Hyperplastic polyp of ascending colon    Vasomotor rhinitis    Liver hemangioma     Past Medical History:   Diagnosis Date    GERD (gastroesophageal reflux disease)     Pure hypercholesterolemia 5/3/2016    Vocal cord dysfunction      Past Surgical History:   Procedure Laterality Date    BRONCHOSCOPY  09/2016    for hemoptysis--negative    CHOLECYSTECTOMY      COLONOSCOPY N/A 6/17/2021    COLONOSCOPY/ BMI 35 performed by Ayden Alonso MD at [facility] RADHAFORCEP/CAUTERY  6/20/2021         GYN      tubal ligation    LARYNGECTOMY  08/26/15    ROTATOR CUFF REPAIR Right      Family History   Problem Relation Age of Onset    Coronary Art Dis Mother     Coronary Art Dis Father      Social History     Tobacco Use    Smoking status: Never     Passive exposure: Never    Smokeless tobacco: Never   Substance Use Topics    Alcohol use: Not Currently     Alcohol/week: 0.0 standard drinks         Review of Systems   Constitutional:  Positive for fatigue and fever. Negative for chills. HENT:  Negative for congestion, rhinorrhea, sinus pressure, sneezing, sore throat and voice change. Fever blister upper left lip   Respiratory:  Positive for cough. Negative for wheezing. Cardiovascular:  Negative for chest pain. Gastrointestinal:  Negative for nausea and vomiting. Musculoskeletal:  Negative for arthralgias. Skin:  Negative for rash. Neurological:  Negative for headaches. Hematological:  Negative for adenopathy. OBJECTIVE:  /80 (Site: Left Upper Arm, Position: Sitting, Cuff Size: Small Adult)   Ht 5' 3\" (1.6 m)   Wt 185 lb (83.9 kg)   BMI 32.77 kg/m²      Physical Exam  Nursing note reviewed. Constitutional:       General: She is not in acute distress. Appearance: Normal appearance. HENT:      Head: Normocephalic and atraumatic. Right Ear: Tympanic membrane normal.      Left Ear: Tympanic membrane normal.      Nose: Nose normal.      Mouth/Throat:      Mouth: Mucous membranes are moist.   Cardiovascular:      Rate and Rhythm: Normal rate and regular rhythm. Heart sounds: Normal heart sounds. Pulmonary:      Effort: Pulmonary effort is normal. No accessory muscle usage or respiratory distress. Breath sounds: No decreased air movement. Examination of the right-upper field reveals wheezing.  Examination of the left-upper field reveals wheezing. Examination of the right-middle field reveals wheezing. Examination of the left-middle field reveals wheezing. Wheezing present. Musculoskeletal:      Cervical back: Normal range of motion and neck supple. Neurological:      Mental Status: She is alert. Medical problems and test results were reviewed with the patient today. No results found for this or any previous visit (from the past 672 hour(s)). ASSESSMENT and PLAN    Visit Diagnoses and Associated Orders       Acute recurrent frontal sinusitis    -  Primary    amoxicillin-clavulanate (AUGMENTIN) 875-125 MG per tablet [42189]      methylPREDNISolone (MEDROL, NATALIE,) 4 MG tablet [4991]           Cough, unspecified type        HYDROcodone homatropine (HYCODAN) 5-1.5 MG/5ML solution [227914]      methylPREDNISolone (MEDROL, NATALIE,) 4 MG tablet [4991]           Herpes labialis        valACYclovir (VALTREX) 1 g tablet [45232]           Mild persistent asthma with (acute) exacerbation        albuterol sulfate HFA (PROVENTIL;VENTOLIN;PROAIR) 108 (90 Base) MCG/ACT inhaler [16830]                       Diagnosis Orders   1. Acute recurrent frontal sinusitis  amoxicillin-clavulanate (AUGMENTIN) 875-125 MG per tablet    methylPREDNISolone (MEDROL, NATALIE,) 4 MG tablet      2. Cough, unspecified type  HYDROcodone homatropine (HYCODAN) 5-1.5 MG/5ML solution    methylPREDNISolone (MEDROL, NATALIE,) 4 MG tablet      3. Herpes labialis  valACYclovir (VALTREX) 1 g tablet      4. Mild persistent asthma with (acute) exacerbation  albuterol sulfate HFA (PROVENTIL;VENTOLIN;PROAIR) 108 (90 Base) MCG/ACT inhaler      , Emily was seen today for chest congestion. Diagnoses and all orders for this visit:    Acute recurrent frontal sinusitis  -     amoxicillin-clavulanate (AUGMENTIN) 875-125 MG per tablet;  Take 1 tablet by mouth 2 times daily for 7 days  -     methylPREDNISolone (MEDROL, NATALIE,) 4 MG tablet; Day 1:  Take 1 tablet 6 times daily, Day 2:  Take 1 tablet 5 times daily, Day 3:  Take 1 tablet 4 times daily, Day 4:  Take 1 tablet 3 times daily, Day 5:  Take 1 tablet 2 times daily, Day 6:  Take 1 tablet 1 time daily then stop. Cough, unspecified type  -     HYDROcodone homatropine (HYCODAN) 5-1.5 MG/5ML solution; Take 5 mLs by mouth every 6 hours as needed (as needed for Cough) for up to 7 days. Max Daily Amount: 20 mLs  -     methylPREDNISolone (MEDROL, NATALIE,) 4 MG tablet; Day 1:  Take 1 tablet 6 times daily, Day 2:  Take 1 tablet 5 times daily, Day 3:  Take 1 tablet 4 times daily, Day 4:  Take 1 tablet 3 times daily, Day 5:  Take 1 tablet 2 times daily, Day 6:  Take 1 tablet 1 time daily then stop. Herpes labialis  -     valACYclovir (VALTREX) 1 g tablet; Take 2 tablets by mouth 2 times daily    Mild persistent asthma with (acute) exacerbation  -     albuterol sulfate HFA (PROVENTIL;VENTOLIN;PROAIR) 108 (90 Base) MCG/ACT inhaler;  Inhale 2 puffs into the lungs every 4 hours as needed for Wheezing  , Patient will continue on albuterol as directed Valtrex for her herpes Hycodan cough medicine at bedtime Mucinex DM during the day Medrol Dosepak Augmentin and follow-up with signs symptoms persist or worsen

## 2023-03-02 ENCOUNTER — HOSPITAL ENCOUNTER (EMERGENCY)
Age: 53
Discharge: HOME | End: 2023-03-02
Attending: EMERGENCY MEDICINE
Payer: MEDICAID

## 2023-03-02 VITALS
SYSTOLIC BLOOD PRESSURE: 123 MMHG | HEART RATE: 60 BPM | HEIGHT: 63 IN | TEMPERATURE: 97.8 F | RESPIRATION RATE: 16 BRPM | DIASTOLIC BLOOD PRESSURE: 82 MMHG | OXYGEN SATURATION: 99 % | BODY MASS INDEX: 32.78 KG/M2 | WEIGHT: 185 LBS

## 2023-03-02 DIAGNOSIS — M54.10 RADICULAR PAIN OF LEFT LOWER EXTREMITY: Primary | ICD-10-CM

## 2023-03-02 PROCEDURE — 99283 EMERGENCY DEPT VISIT LOW MDM: CPT

## 2023-03-02 RX ORDER — METHYLPREDNISOLONE 4 MG/1
TABLET ORAL
Qty: 1 KIT | Refills: 0 | Status: SHIPPED | OUTPATIENT
Start: 2023-03-02 | End: 2023-03-08

## 2023-03-02 ASSESSMENT — PAIN - FUNCTIONAL ASSESSMENT
PAIN_FUNCTIONAL_ASSESSMENT: 0-10
PAIN_FUNCTIONAL_ASSESSMENT: 0-10

## 2023-03-02 ASSESSMENT — LIFESTYLE VARIABLES
HOW OFTEN DO YOU HAVE A DRINK CONTAINING ALCOHOL: NEVER
HOW MANY STANDARD DRINKS CONTAINING ALCOHOL DO YOU HAVE ON A TYPICAL DAY: PATIENT DOES NOT DRINK

## 2023-03-02 ASSESSMENT — PAIN SCALES - GENERAL
PAINLEVEL_OUTOF10: 10
PAINLEVEL_OUTOF10: 8

## 2023-03-02 NOTE — ED TRIAGE NOTES
X3 days of LLE Pain/intermittent numbness. Reports pain starts at bottom of heel and radiates upward towards thigh. Reports numbness comes and goes. No relief with medications. Minor swelling seen around ankle/shin area. Reports pain keeps her up at night and she is unable to walk.  Denies injury/trauma

## 2023-03-02 NOTE — DISCHARGE INSTRUCTIONS
Your symptoms seem to be consistent with a pain similar to a radicular nerve pain. Take the steroid medication prescribed to you. Follow-up with your primary care provider at the next scheduled appointment.

## 2023-03-03 NOTE — ED PROVIDER NOTES
[facility] Emergency Department Provider Note                   PCP:                Alejandro Belle DO               Age: 46 y.o. Sex: female       ICD-10-CM    1. Radicular pain of left lower extremity  M54.10           DISPOSITION Decision To Discharge 03/02/2023 04:56:23 PM         No orders of the defined types were placed in this encounter. Conrad Moore is a 46 y.o. female who presents to the Emergency Department with chief complaint of    Chief Complaint   Patient presents with    Leg Pain      22-year-old female complaining of 3-day history of left upper leg pain that radiates from her low back down her leg into her lower leg. She states that the pain is intermittent in nature and is not received relief from over-the-counter medications. Denies shortness of breath. The history is provided by the patient. Review of Systems    Past Medical History:   Diagnosis Date    GERD (gastroesophageal reflux disease)     Pure hypercholesterolemia 5/3/2016    Vocal cord dysfunction         Past Surgical History:   Procedure Laterality Date    BRONCHOSCOPY  09/2016    for hemoptysis--negative    CHOLECYSTECTOMY      COLONOSCOPY N/A 6/17/2021    COLONOSCOPY/ BMI 35 performed by Alyssia Palafox MD at [facility] ENDOSCOPY    COLONOSCOPY,MICHAEL MORALES/CAUTERY  6/20/2021         GYN      tubal ligation    LARYNGECTOMY  08/26/15    ROTATOR CUFF REPAIR Right         Family History   Problem Relation Age of Onset    Coronary Art Dis Mother     Coronary Art Dis Father         Social History     Socioeconomic History    Marital status:    Tobacco Use    Smoking status: Never     Passive exposure: Never    Smokeless tobacco: Never   Vaping Use    Vaping Use: Never used   Substance and Sexual Activity    Alcohol use: Not Currently     Alcohol/week: 0.0 standard drinks    Drug use: No   Social History Narrative     and lives with . Has lived in [place] and [place]. Has one dog.   Previously worked as a .       [facility] Strain: Low Risk     Difficulty of Paying Living Expenses: Not hard at all   Food Insecurity: No Food Insecurity    Worried About [facility] in the Last Year: Never true    [facility] in the Last Year: Never true         Azithromycin     Discharge Medication List as of 3/2/2023  5:12 PM        CONTINUE these medications which have NOT CHANGED    Details   valACYclovir (VALTREX) 1 g tablet Take 2 tablets by mouth 2 times daily, Disp-6 tablet, R-4Normal      albuterol sulfate HFA (PROVENTIL;VENTOLIN;PROAIR) 108 (90 Base) MCG/ACT inhaler Inhale 2 puffs into the lungs every 4 hours as needed for Wheezing, Disp-18 g, R-5Normal      estradiol (ESTRACE) 2 MG tablet Take 1 tablet by mouth daily, Disp-90 tablet, R-3Normal      medroxyPROGESTERone (PROVERA) 5 MG tablet Take 1 tablet by mouth daily, Disp-93 tablet, R-3Normal      vitamin D (ERGOCALCIFEROL) 1.25 MG (62374 UT) CAPS capsule Take 1 capsule by mouth once a week, Disp-4 capsule, R-11Normal      calcium carbonate 648 MG TABS Take 1 tablet by mouth 2 times daily, Disp-60 tablet, R-11Normal      cyclobenzaprine (FLEXERIL) 10 MG tablet Take 1 tablet by mouth 2 times daily as needed for Muscle spasms, Disp-60 tablet, R-0Normal      fluticasone (FLONASE) 50 MCG/ACT nasal spray 1 spray by Nasal route in the morning. SPRAY 2 SPRAYS INTO EACH NOSTRIL EVERY DAY., Disp-16 g, R-5Normal      montelukast (SINGULAIR) 10 MG tablet Take 1 tablet by mouth nightly TAKE 1 TABLET BY MOUTH EVERY DAY, Disp-90 tablet, R-3Normal      omeprazole (PRILOSEC) 40 MG delayed release capsule Take 1 capsule by mouth in the morning., Disp-90 capsule, R-3Normal      rosuvastatin (CRESTOR) 20 MG tablet Take 1 tablet by mouth in the morning., Disp-90 tablet, R-3Normal      naproxen (NAPROSYN) 500 MG tablet Take 1 tablet by mouth in the morning and 1 tablet in the evening. Take with meals. , Disp60 tablet, R-0Normal      Coenzyme Q10 10 MG CAPS 1 p.o. daily for prevention of myalgias due to statinsHistorical Med      neomycin-polymyxin-hydrocortisone (CORTISPORIN) 3.5-58016-8 otic suspension Apply 2 drops to affected ear 4 times a dayHistorical Med              Vitals signs and nursing note reviewed. No data found. Physical Exam  Constitutional:       General: She is not in acute distress. Appearance: Normal appearance. She is not ill-appearing. HENT:      Head: Normocephalic and atraumatic. Eyes:      Extraocular Movements: Extraocular movements intact. Conjunctiva/sclera: Conjunctivae normal.      Pupils: Pupils are equal, round, and reactive to light. Cardiovascular:      Comments: Peripheral pulses of the left lower extremity intact. Neurological:      Mental Status: She is alert. MDM  Number of Diagnoses or Management Options  Radicular pain of left lower extremity  Diagnosis management comments: Peripheral pulsation of the left lower extremity intact. There is no calf tenderness on examination and both calves appear to be the same diameter with no increased swelling of 1 greater than the other. Denies urinary retention, fecal incontinence, and saddle anesthesias. Given that her pain originates in her low back and radiates down her left leg, suspicion that she is experiencing a pain of radicular etiology. Prescribed outpatient Medrol dose pack. Complexity of Problem: 1 acute, uncomplicated illness or injury. (3)                Patient was discharged risks and benefits of hospitalization were considered. Patient Progress  Patient progress: stable      Procedures      Labs Reviewed - No data to display     No orders to display                          Voice dictation software was used during the making of this note. This software is not perfect and grammatical and other typographical errors may be present. This note has not been completely proofread for errors. KEVIN Burch  03/02/23 [id number]

## 2023-04-24 ENCOUNTER — NURSE ONLY (OUTPATIENT)
Dept: FAMILY MEDICINE CLINIC | Facility: CLINIC | Age: 53
End: 2023-04-24

## 2023-04-24 DIAGNOSIS — Z11.1 SCREENING FOR TUBERCULOSIS: Primary | ICD-10-CM

## 2023-04-24 SDOH — ECONOMIC STABILITY: INCOME INSECURITY: HOW HARD IS IT FOR YOU TO PAY FOR THE VERY BASICS LIKE FOOD, HOUSING, MEDICAL CARE, AND HEATING?: NOT HARD AT ALL

## 2023-04-24 SDOH — ECONOMIC STABILITY: HOUSING INSECURITY
IN THE LAST 12 MONTHS, WAS THERE A TIME WHEN YOU DID NOT HAVE A STEADY PLACE TO SLEEP OR SLEPT IN A SHELTER (INCLUDING NOW)?: NO

## 2023-04-24 SDOH — ECONOMIC STABILITY: FOOD INSECURITY: WITHIN THE PAST 12 MONTHS, YOU WORRIED THAT YOUR FOOD WOULD RUN OUT BEFORE YOU GOT MONEY TO BUY MORE.: NEVER TRUE

## 2023-04-24 SDOH — ECONOMIC STABILITY: FOOD INSECURITY: WITHIN THE PAST 12 MONTHS, THE FOOD YOU BOUGHT JUST DIDN'T LAST AND YOU DIDN'T HAVE MONEY TO GET MORE.: NEVER TRUE

## 2023-04-24 ASSESSMENT — PATIENT HEALTH QUESTIONNAIRE - PHQ9
2. FEELING DOWN, DEPRESSED OR HOPELESS: 0
SUM OF ALL RESPONSES TO PHQ9 QUESTIONS 1 & 2: 0
1. LITTLE INTEREST OR PLEASURE IN DOING THINGS: 0
SUM OF ALL RESPONSES TO PHQ QUESTIONS 1-9: 0

## 2023-04-26 ENCOUNTER — NURSE ONLY (OUTPATIENT)
Dept: FAMILY MEDICINE CLINIC | Facility: CLINIC | Age: 53
End: 2023-04-26

## 2023-04-26 DIAGNOSIS — Z11.1 ENCOUNTER FOR PPD SKIN TEST READING: Primary | ICD-10-CM

## 2023-04-26 LAB
MM INDURATION, POC: 0 MM (ref 0–5)
PPD, POC: NEGATIVE

## 2023-05-05 ENCOUNTER — OFFICE VISIT (OUTPATIENT)
Dept: FAMILY MEDICINE CLINIC | Facility: CLINIC | Age: 53
End: 2023-05-05
Payer: MEDICAID

## 2023-05-05 ENCOUNTER — HOSPITAL ENCOUNTER (OUTPATIENT)
Dept: GENERAL RADIOLOGY | Age: 53
Discharge: HOME | End: 2023-05-08

## 2023-05-05 VITALS
HEIGHT: 63 IN | BODY MASS INDEX: 33.66 KG/M2 | WEIGHT: 190 LBS | SYSTOLIC BLOOD PRESSURE: 124 MMHG | DIASTOLIC BLOOD PRESSURE: 80 MMHG

## 2023-05-05 DIAGNOSIS — M54.41 CHRONIC RIGHT-SIDED LOW BACK PAIN WITH RIGHT-SIDED SCIATICA: ICD-10-CM

## 2023-05-05 DIAGNOSIS — M79.672 INTRACTABLE LEFT HEEL PAIN: ICD-10-CM

## 2023-05-05 DIAGNOSIS — M79.672 INTRACTABLE LEFT HEEL PAIN: Primary | ICD-10-CM

## 2023-05-05 DIAGNOSIS — G89.29 CHRONIC RIGHT-SIDED LOW BACK PAIN WITH RIGHT-SIDED SCIATICA: ICD-10-CM

## 2023-05-05 PROCEDURE — 99213 OFFICE O/P EST LOW 20 MIN: CPT | Performed by: FAMILY MEDICINE

## 2023-05-05 RX ORDER — NAPROXEN 500 MG/1
500 TABLET ORAL 2 TIMES DAILY WITH MEALS
Qty: 60 TABLET | Refills: 0 | Status: SHIPPED | OUTPATIENT
Start: 2023-05-05 | End: 2023-06-04

## 2023-05-05 ASSESSMENT — PATIENT HEALTH QUESTIONNAIRE - PHQ9
1. LITTLE INTEREST OR PLEASURE IN DOING THINGS: 0
2. FEELING DOWN, DEPRESSED OR HOPELESS: 0
SUM OF ALL RESPONSES TO PHQ QUESTIONS 1-9: 0
SUM OF ALL RESPONSES TO PHQ9 QUESTIONS 1 & 2: 0
SUM OF ALL RESPONSES TO PHQ QUESTIONS 1-9: 0

## 2023-05-05 ASSESSMENT — ENCOUNTER SYMPTOMS
NAUSEA: 0
SHORTNESS OF BREATH: 0
VOMITING: 0

## 2023-05-05 NOTE — PROGRESS NOTES
PROGRESS NOTE    SUBJECTIVE:   Cate Bang is a 46 y.o. female seen for a follow up visit regarding the following chief complaint:     Chief Complaint   Patient presents with    Foot Pain     L heel pain worse in the past month           HPI patient is complaining of left heel pain without trauma      Past Medical History, Past Surgical History, Family history, Social History, and Medications were all reviewed with the patient today and updated as necessary. Current Outpatient Medications   Medication Sig Dispense Refill    fluticasone (FLONASE) 50 MCG/ACT nasal spray 1 SPRAY BY NASAL ROUTE IN THE MORNING. 1 each 5    valACYclovir (VALTREX) 1 g tablet Take 2 tablets by mouth 2 times daily 6 tablet 4    albuterol sulfate HFA (PROVENTIL;VENTOLIN;PROAIR) 108 (90 Base) MCG/ACT inhaler Inhale 2 puffs into the lungs every 4 hours as needed for Wheezing 18 g 5    estradiol (ESTRACE) 2 MG tablet Take 1 tablet by mouth daily 90 tablet 3    medroxyPROGESTERone (PROVERA) 5 MG tablet Take 1 tablet by mouth daily 93 tablet 3    vitamin D (ERGOCALCIFEROL) 1.25 MG (07641 UT) CAPS capsule Take 1 capsule by mouth once a week 4 capsule 11    calcium carbonate 648 MG TABS Take 1 tablet by mouth 2 times daily 60 tablet 11    cyclobenzaprine (FLEXERIL) 10 MG tablet Take 1 tablet by mouth 2 times daily as needed for Muscle spasms 60 tablet 0    montelukast (SINGULAIR) 10 MG tablet Take 1 tablet by mouth nightly TAKE 1 TABLET BY MOUTH EVERY DAY 90 tablet 3    omeprazole (PRILOSEC) 40 MG delayed release capsule Take 1 capsule by mouth in the morning. 90 capsule 3    rosuvastatin (CRESTOR) 20 MG tablet Take 1 tablet by mouth in the morning. 90 tablet 3    naproxen (NAPROSYN) 500 MG tablet Take 1 tablet by mouth in the morning and 1 tablet in the evening. Take with meals.  60 tablet 0    Coenzyme Q10 10 MG CAPS 1 p.o. daily for prevention of myalgias due to statins      neomycin-polymyxin-hydrocortisone (CORTISPORIN) 3.5-30086-8

## 2023-05-26 ENCOUNTER — OFFICE VISIT (OUTPATIENT)
Dept: FAMILY MEDICINE CLINIC | Facility: CLINIC | Age: 53
End: 2023-05-26
Payer: COMMERCIAL

## 2023-05-26 VITALS
DIASTOLIC BLOOD PRESSURE: 86 MMHG | SYSTOLIC BLOOD PRESSURE: 130 MMHG | WEIGHT: 194 LBS | BODY MASS INDEX: 34.38 KG/M2 | HEIGHT: 63 IN

## 2023-05-26 DIAGNOSIS — M79.672 INTRACTABLE LEFT HEEL PAIN: Primary | ICD-10-CM

## 2023-05-26 PROCEDURE — 99213 OFFICE O/P EST LOW 20 MIN: CPT | Performed by: FAMILY MEDICINE

## 2023-05-26 PROCEDURE — 20605 DRAIN/INJ JOINT/BURSA W/O US: CPT | Performed by: FAMILY MEDICINE

## 2023-05-26 RX ORDER — TRIAMCINOLONE ACETONIDE 40 MG/ML
40 INJECTION, SUSPENSION INTRA-ARTICULAR; INTRAMUSCULAR ONCE
Status: COMPLETED | OUTPATIENT
Start: 2023-05-26 | End: 2023-05-26

## 2023-05-26 RX ADMIN — TRIAMCINOLONE ACETONIDE 40 MG: 40 INJECTION, SUSPENSION INTRA-ARTICULAR; INTRAMUSCULAR at 13:47

## 2023-05-26 ASSESSMENT — PATIENT HEALTH QUESTIONNAIRE - PHQ9
SUM OF ALL RESPONSES TO PHQ QUESTIONS 1-9: 0
1. LITTLE INTEREST OR PLEASURE IN DOING THINGS: 0
2. FEELING DOWN, DEPRESSED OR HOPELESS: 0
SUM OF ALL RESPONSES TO PHQ QUESTIONS 1-9: 0
SUM OF ALL RESPONSES TO PHQ9 QUESTIONS 1 & 2: 0

## 2023-05-26 ASSESSMENT — ENCOUNTER SYMPTOMS
NAUSEA: 0
SHORTNESS OF BREATH: 0
VOMITING: 0

## 2023-05-26 NOTE — PROGRESS NOTES
TAKE 1 TABLET BY MOUTH EVERY DAY 90 tablet 3    omeprazole (PRILOSEC) 40 MG delayed release capsule Take 1 capsule by mouth in the morning.  90 capsule 3     Current Facility-Administered Medications   Medication Dose Route Frequency Provider Last Rate Last Admin    triamcinolone acetonide (KENALOG-40) injection 40 mg  40 mg IntraMUSCular Once Scheryl Ana M, DO         Allergies   Allergen Reactions    Azithromycin Hives     Patient Active Problem List   Diagnosis    History of uterine fibroid    LPRD (laryngopharyngeal reflux disease)    Fibroids    Chronic pelvic pain in female    Vaginal bleeding    Disorder of vocal cords    Family history of ischemic heart disease (IHD)    Vulvovaginal candidiasis    Hot flashes due to menopause    Dyspareunia in female    Environmental and seasonal allergies    Bacterial vaginosis    Fibromyalgia    Precordial pain    Pure hypercholesterolemia    Vocal cord dysfunction    Mild persistent asthma with acute exacerbation    Hemoptysis    Hyperplastic polyp of ascending colon    Vasomotor rhinitis    Liver hemangioma     Past Medical History:   Diagnosis Date    GERD (gastroesophageal reflux disease)     Pure hypercholesterolemia 5/3/2016    Vocal cord dysfunction      Past Surgical History:   Procedure Laterality Date    BRONCHOSCOPY  09/2016    for hemoptysis--negative    CHOLECYSTECTOMY      COLONOSCOPY N/A 6/17/2021    COLONOSCOPY/ BMI 35 performed by Alyssia Palafox MD at [facility] ENDOSCOPY    COLONOSCOPY,MICHAEL MORALES/CAUTERY  6/20/2021         GYN      tubal ligation    LARYNGECTOMY  08/26/15    ROTATOR CUFF REPAIR Right      Family History   Problem Relation Age of Onset    Coronary Art Dis Mother     Coronary Art Dis Father      Social History     Tobacco Use    Smoking status: Never     Passive exposure: Never    Smokeless tobacco: Never   Substance Use Topics    Alcohol use: Not Currently     Alcohol/week: 0.0 standard drinks         Review of Systems   Constitutional:

## 2023-06-01 DIAGNOSIS — G89.29 CHRONIC RIGHT-SIDED LOW BACK PAIN WITH RIGHT-SIDED SCIATICA: ICD-10-CM

## 2023-06-01 DIAGNOSIS — M54.41 CHRONIC RIGHT-SIDED LOW BACK PAIN WITH RIGHT-SIDED SCIATICA: ICD-10-CM

## 2023-06-01 RX ORDER — NAPROXEN 500 MG/1
TABLET ORAL
Qty: 60 TABLET | Refills: 0 | OUTPATIENT
Start: 2023-06-01

## 2023-08-22 ENCOUNTER — NURSE ONLY (OUTPATIENT)
Dept: FAMILY MEDICINE CLINIC | Facility: CLINIC | Age: 53
End: 2023-08-22
Payer: COMMERCIAL

## 2023-08-22 DIAGNOSIS — Z00.00 ROUTINE GENERAL MEDICAL EXAMINATION AT A HEALTH CARE FACILITY: ICD-10-CM

## 2023-08-22 DIAGNOSIS — Z12.31 ENCOUNTER FOR SCREENING MAMMOGRAM FOR MALIGNANT NEOPLASM OF BREAST: ICD-10-CM

## 2023-08-22 PROCEDURE — 81002 URINALYSIS NONAUTO W/O SCOPE: CPT | Performed by: FAMILY MEDICINE

## 2023-08-22 PROCEDURE — 85025 COMPLETE CBC W/AUTO DIFF WBC: CPT | Performed by: FAMILY MEDICINE

## 2023-08-23 LAB
25(OH)D3 SERPL-MCNC: 29 NG/ML (ref 30–100)
ALBUMIN SERPL-MCNC: 3.6 G/DL (ref 3.5–5)
ALBUMIN/GLOB SERPL: 1.2 (ref 0.4–1.6)
ALP SERPL-CCNC: 87 U/L (ref 50–136)
ALT SERPL-CCNC: 25 U/L (ref 12–65)
ANION GAP SERPL CALC-SCNC: 2 MMOL/L (ref 2–11)
AST SERPL-CCNC: 9 U/L (ref 15–37)
BILIRUB SERPL-MCNC: 0.2 MG/DL (ref 0.2–1.1)
BILIRUBIN, URINE, POC: NEGATIVE
BLOOD URINE, POC: NEGATIVE
BUN SERPL-MCNC: 14 MG/DL (ref 6–23)
CALCIUM SERPL-MCNC: 9.3 MG/DL (ref 8.3–10.4)
CHLORIDE SERPL-SCNC: 112 MMOL/L (ref 101–110)
CHOLEST SERPL-MCNC: 239 MG/DL
CO2 SERPL-SCNC: 27 MMOL/L (ref 21–32)
CREAT SERPL-MCNC: 0.7 MG/DL (ref 0.6–1)
GLOBULIN SER CALC-MCNC: 3.1 G/DL (ref 2.8–4.5)
GLUCOSE SERPL-MCNC: 116 MG/DL (ref 65–100)
GLUCOSE URINE, POC: NEGATIVE
GRANS ABSOLUTE, POC: 3.7 K/UL
GRANULOCYTES %, POC: 47.8 %
HDLC SERPL-MCNC: 47 MG/DL (ref 40–60)
HDLC SERPL: 5.1
HEMATOCRIT, POC: 46.1 %
HEMOGLOBIN, POC: 15.1 G/DL
KETONES, URINE, POC: NEGATIVE
LDLC SERPL CALC-MCNC: 117.6 MG/DL
LEUKOCYTE ESTERASE, URINE, POC: NEGATIVE
LYMPHOCYTE %, POC: 45.1 %
LYMPHS ABSOLUTE, POC: 3.5 K/UL
MCH, POC: NORMAL PG (ref 40–?)
MCHC, POC: 32.8
MCV, POC: 95.6
MONOCYTE %, POC: 7.1 %
MONOCYTE, ABSOLUTE POC: 0.6 K/UL
MPV, POC: 10.4 FL
NITRITE, URINE, POC: NEGATIVE
PH, URINE, POC: 5 (ref 4.6–8)
PLATELET COUNT, POC: 228 K/UL
POTASSIUM SERPL-SCNC: 4.4 MMOL/L (ref 3.5–5.1)
PROT SERPL-MCNC: 6.7 G/DL (ref 6.3–8.2)
PROTEIN,URINE, POC: NEGATIVE
RBC, POC: 4.82 M/UL
RDW, POC: 13.3 %
SODIUM SERPL-SCNC: 141 MMOL/L (ref 133–143)
SPECIFIC GRAVITY, URINE, POC: 1.03 (ref 1–1.03)
TRIGL SERPL-MCNC: 372 MG/DL (ref 35–150)
TSH, 3RD GENERATION: 1.96 UIU/ML (ref 0.36–3.74)
URINALYSIS CLARITY, POC: CLEAR
URINALYSIS COLOR, POC: YELLOW
UROBILINOGEN, POC: NORMAL
VLDLC SERPL CALC-MCNC: 74.4 MG/DL (ref 6–23)
WBC, POC: 7.8 K/UL

## 2023-10-06 DIAGNOSIS — M85.80 OSTEOPENIA, UNSPECIFIED LOCATION: ICD-10-CM

## 2023-10-06 DIAGNOSIS — R79.89 LOW VITAMIN D LEVEL: ICD-10-CM

## 2023-10-06 DIAGNOSIS — E78.01 FAMILIAL HYPERCHOLESTEROLEMIA: ICD-10-CM

## 2023-10-09 RX ORDER — ESTRADIOL 2 MG/1
2 TABLET ORAL DAILY
Qty: 90 TABLET | Refills: 3 | OUTPATIENT
Start: 2023-10-09

## 2023-10-12 RX ORDER — ERGOCALCIFEROL 1.25 MG/1
50000 CAPSULE ORAL WEEKLY
Qty: 4 CAPSULE | Refills: 0 | Status: SHIPPED | OUTPATIENT
Start: 2023-10-12

## 2023-10-12 RX ORDER — ROSUVASTATIN CALCIUM 20 MG/1
20 TABLET, COATED ORAL EVERY MORNING
Qty: 90 TABLET | Refills: 0 | Status: SHIPPED | OUTPATIENT
Start: 2023-10-12

## 2023-10-27 ENCOUNTER — OFFICE VISIT (OUTPATIENT)
Dept: FAMILY MEDICINE CLINIC | Facility: CLINIC | Age: 53
End: 2023-10-27
Payer: COMMERCIAL

## 2023-10-27 VITALS
HEIGHT: 63 IN | DIASTOLIC BLOOD PRESSURE: 80 MMHG | SYSTOLIC BLOOD PRESSURE: 130 MMHG | BODY MASS INDEX: 33.49 KG/M2 | OXYGEN SATURATION: 98 % | HEART RATE: 71 BPM | WEIGHT: 189 LBS

## 2023-10-27 DIAGNOSIS — E78.01 FAMILIAL HYPERCHOLESTEROLEMIA: ICD-10-CM

## 2023-10-27 DIAGNOSIS — B00.1 HERPES LABIALIS: ICD-10-CM

## 2023-10-27 DIAGNOSIS — K21.9 GASTRO-ESOPHAGEAL REFLUX DISEASE WITHOUT ESOPHAGITIS: ICD-10-CM

## 2023-10-27 DIAGNOSIS — M79.672 BILATERAL FOOT PAIN: ICD-10-CM

## 2023-10-27 DIAGNOSIS — M85.80 OSTEOPENIA, UNSPECIFIED LOCATION: ICD-10-CM

## 2023-10-27 DIAGNOSIS — R79.89 LOW VITAMIN D LEVEL: ICD-10-CM

## 2023-10-27 DIAGNOSIS — J30.89 OTHER ALLERGIC RHINITIS: ICD-10-CM

## 2023-10-27 DIAGNOSIS — J45.31 MILD PERSISTENT ASTHMA WITH (ACUTE) EXACERBATION: ICD-10-CM

## 2023-10-27 DIAGNOSIS — M54.41 CHRONIC RIGHT-SIDED LOW BACK PAIN WITH RIGHT-SIDED SCIATICA: ICD-10-CM

## 2023-10-27 DIAGNOSIS — F33.1 MODERATE EPISODE OF RECURRENT MAJOR DEPRESSIVE DISORDER (HCC): Primary | ICD-10-CM

## 2023-10-27 DIAGNOSIS — G89.29 CHRONIC RIGHT-SIDED LOW BACK PAIN WITH RIGHT-SIDED SCIATICA: ICD-10-CM

## 2023-10-27 DIAGNOSIS — M79.671 BILATERAL FOOT PAIN: ICD-10-CM

## 2023-10-27 PROCEDURE — 99396 PREV VISIT EST AGE 40-64: CPT | Performed by: FAMILY MEDICINE

## 2023-10-27 RX ORDER — ROSUVASTATIN CALCIUM 20 MG/1
20 TABLET, COATED ORAL EVERY MORNING
Qty: 90 TABLET | Refills: 4 | Status: SHIPPED | OUTPATIENT
Start: 2023-10-27

## 2023-10-27 RX ORDER — SERTRALINE HYDROCHLORIDE 100 MG/1
TABLET, FILM COATED ORAL
Qty: 90 TABLET | Refills: 3 | Status: SHIPPED | OUTPATIENT
Start: 2023-10-27

## 2023-10-27 RX ORDER — ALBUTEROL SULFATE 90 UG/1
2 AEROSOL, METERED RESPIRATORY (INHALATION) EVERY 4 HOURS PRN
Qty: 18 G | Refills: 5 | Status: SHIPPED | OUTPATIENT
Start: 2023-10-27

## 2023-10-27 RX ORDER — VALACYCLOVIR HYDROCHLORIDE 1 G/1
2000 TABLET, FILM COATED ORAL 2 TIMES DAILY
Qty: 6 TABLET | Refills: 5 | Status: SHIPPED | OUTPATIENT
Start: 2023-10-27

## 2023-10-27 RX ORDER — OMEPRAZOLE 40 MG/1
40 CAPSULE, DELAYED RELEASE ORAL DAILY
Qty: 90 CAPSULE | Refills: 4 | Status: SHIPPED | OUTPATIENT
Start: 2023-10-27 | End: 2025-01-19

## 2023-10-27 RX ORDER — MONTELUKAST SODIUM 10 MG/1
10 TABLET ORAL NIGHTLY
Qty: 90 TABLET | Refills: 3 | Status: SHIPPED | OUTPATIENT
Start: 2023-10-27 | End: 2024-10-21

## 2023-10-27 RX ORDER — FLUTICASONE PROPIONATE 50 MCG
SPRAY, SUSPENSION (ML) NASAL
Qty: 1 EACH | Refills: 5 | Status: SHIPPED | OUTPATIENT
Start: 2023-10-27

## 2023-10-27 RX ORDER — ERGOCALCIFEROL 1.25 MG/1
50000 CAPSULE ORAL WEEKLY
Qty: 4 CAPSULE | Refills: 0 | Status: SHIPPED | OUTPATIENT
Start: 2023-10-27

## 2023-10-27 ASSESSMENT — ENCOUNTER SYMPTOMS
ABDOMINAL PAIN: 0
SHORTNESS OF BREATH: 0
COUGH: 0

## 2023-10-27 NOTE — PROGRESS NOTES
Osteopenia, unspecified location  vitamin D (ERGOCALCIFEROL) 1.25 MG (78441 UT) CAPS capsule      10. Chronic right-sided low back pain with right-sided sciatica  [facility] - Manfred Morales DO, Pain Management, [place] [facility] was seen today for annual exam.    Diagnoses and all orders for this visit:    Moderate episode of recurrent major depressive disorder ([address])  -     sertraline (ZOLOFT) 100 MG tablet; Start with half a pill for 8 days then start 1 pill p.o. daily    Familial hypercholesterolemia  -     rosuvastatin (CRESTOR) 20 MG tablet; Take 1 tablet by mouth every morning  -     Lipid Panel; Future  -     Cancel: [facility]    Herpes labialis  -     valACYclovir (VALTREX) 1 g tablet; Take 2 tablets by mouth 2 times daily    Mild persistent asthma with (acute) exacerbation  -     albuterol sulfate HFA (PROVENTIL;VENTOLIN;PROAIR) 108 (90 Base) MCG/ACT inhaler; Inhale 2 puffs into the lungs every 4 hours as needed for Wheezing    Other allergic rhinitis  -     montelukast (SINGULAIR) 10 MG tablet; Take 1 tablet by mouth nightly TAKE 1 TABLET BY MOUTH EVERY DAY  -     fluticasone (FLONASE) 50 MCG/ACT nasal spray; 1 SPRAY BY NASAL ROUTE IN THE MORNING. Gastro-esophageal reflux disease without esophagitis  -     omeprazole (PRILOSEC) 40 MG delayed release capsule; Take 1 capsule by mouth daily    Bilateral foot pain  -     [facility] - [facility] [employer], [address] [facility]    Low vitamin D level  -     vitamin D (ERGOCALCIFEROL) 1.25 MG (91988 UT) CAPS capsule; Take 1 capsule by mouth once a week    Osteopenia, unspecified location  -     vitamin D (ERGOCALCIFEROL) 1.25 MG (33404 UT) CAPS capsule;  Take 1 capsule by mouth once a week    Chronic right-sided low back pain with right-sided sciatica  -     Cancel: Diane Winkler MD, Neurosurgery, [address], DO [place], Pain Management, [place]    , Otherwise normal routine

## 2023-11-08 ENCOUNTER — OFFICE VISIT (OUTPATIENT)
Dept: ORTHOPEDIC SURGERY | Age: 53
End: 2023-11-08

## 2023-11-08 DIAGNOSIS — M79.671 BILATERAL FOOT PAIN: Primary | ICD-10-CM

## 2023-11-08 DIAGNOSIS — M72.2 PLANTAR FASCIITIS, LEFT: ICD-10-CM

## 2023-11-08 DIAGNOSIS — M72.2 PLANTAR FASCIITIS, RIGHT: ICD-10-CM

## 2023-11-08 DIAGNOSIS — M79.672 BILATERAL FOOT PAIN: Primary | ICD-10-CM

## 2023-11-08 NOTE — PROGRESS NOTES
Name: Shakira Lopez  YOB: 1970  Gender: female  MRN: [id number]    Summary:     Bilateral Planter fasciitis left greater than right     CC: New Patient (Bilateral foot pain  x-rays taken in the office today)       HPI: Shakira Lopez is a 48 y.o. female who presents with New Patient (Bilateral foot pain  x-rays taken in the office today)  . Patient presents the office today with a several month history of worsening pain located plantar aspect of her heels. She has not tried any specific therapy or treatment for this at this point. History was obtained by Patient     ROS/Meds/PSH/PMH/FH/SH: I personally reviewed the patients standard intake form. Below are the pertinents    Tobacco:  reports that she has never smoked. She has never been exposed to tobacco smoke. She has never used smokeless tobacco.  Diabetes: None      Physical Examination:  Exam of the bilateral feet shows tenderness palpation to plantar medial heels bilaterally. There is no evidence of fascial insufficiency. Is a negative Tinel's at the tarsal tunnel bilaterally. She has palpable pulses and intact sensation. Imaging:   Interpretation of imaging  Bilateral feet XR: AP, Lateral, Oblique views     ICD-10-CM    1. Bilateral foot pain  M79.671 XR Foot Standard Bilateral    M79.672       2. Plantar fasciitis, right  M72.2       3.  Plantar fasciitis, left  M72.2          Report: AP, lateral, oblique x-ray of the bilateral feet demonstrates mild plantar heel spur    Impression: Mall plantar heel spur   Dacia Verdugo III, MD           Assessment:   Bilateral Planter fasciitis left greater than right    Treatment Plan:   4 This is a chronic illness/condition with exacerbation and progression  Treatment at this time: Physical Therapy and Bracing: Placed in: night splint  Studies ordered: NO XR needed @ Next Visit    Weight-bearing status: WBAT        Return to work/work restrictions: none  No medications given    We

## 2023-11-30 DIAGNOSIS — M85.80 OSTEOPENIA, UNSPECIFIED LOCATION: ICD-10-CM

## 2023-11-30 DIAGNOSIS — R79.89 LOW VITAMIN D LEVEL: ICD-10-CM

## 2023-12-01 RX ORDER — ERGOCALCIFEROL 1.25 MG/1
50000 CAPSULE ORAL WEEKLY
Qty: 4 CAPSULE | Refills: 2 | Status: SHIPPED | OUTPATIENT
Start: 2023-12-01

## 2024-02-14 ENCOUNTER — OFFICE VISIT (OUTPATIENT)
Dept: ORTHOPEDIC SURGERY | Age: 54
End: 2024-02-14
Payer: COMMERCIAL

## 2024-02-14 DIAGNOSIS — M72.2 PLANTAR FASCIITIS, RIGHT: Primary | ICD-10-CM

## 2024-02-14 DIAGNOSIS — M72.2 PLANTAR FASCIITIS, LEFT: ICD-10-CM

## 2024-02-14 PROCEDURE — 99213 OFFICE O/P EST LOW 20 MIN: CPT | Performed by: ORTHOPAEDIC SURGERY

## 2024-02-14 NOTE — PROGRESS NOTES
Name: Emily Wallis  YOB: 1970  Gender: female  MRN: [id number]    Summary:   Bilateral plantar fasciitis       CC: Follow-up (Bilateral foot)       HPI: Emily Wallis is a 53 y.o. female who presents with Follow-up (Bilateral foot)  .  This patient presents back the office today with improvements of her bilateral plantar fascia pain.    History was obtained by Patient     ROS/Meds/PSH/PMH/FH/SH: I personally reviewed the patients standard intake form.  Below are the pertinents    Tobacco:  reports that she has never smoked. She has never been exposed to tobacco smoke. She has never used smokeless tobacco.  Diabetes: None      Physical Examination:    Exam of the bilateral feet shows tenderness palpation to plantar medial heels bilaterally.  This is substantially better than previous exam.    Imaging:   No imaging reviewed           HADLEY JAUREGUI III, MD           Assessment:   Bilateral plantar fasciitis    Treatment Plan:   3 This is stable chronic illness/condition  Treatment at this time: Physical Therapy  Studies ordered: NO XR needed @ Next Visit    Weight-bearing status: WBAT        Return to work/work restrictions: none  No medications given    She will advance her activities as tolerated and continue with home exercise program return as needed.

## 2024-02-16 ENCOUNTER — HOSPITAL ENCOUNTER (OUTPATIENT)
Dept: MAMMOGRAPHY | Age: 54
Discharge: HOME | End: 2024-02-19
Attending: FAMILY MEDICINE

## 2024-02-16 DIAGNOSIS — Z12.31 ENCOUNTER FOR SCREENING MAMMOGRAM FOR MALIGNANT NEOPLASM OF BREAST: ICD-10-CM

## 2024-04-05 ENCOUNTER — NURSE ONLY (OUTPATIENT)
Dept: FAMILY MEDICINE CLINIC | Facility: CLINIC | Age: 54
End: 2024-04-05
Payer: MEDICAID

## 2024-04-05 DIAGNOSIS — E78.01 FAMILIAL HYPERCHOLESTEROLEMIA: ICD-10-CM

## 2024-04-05 LAB
CHOLEST SERPL-MCNC: 176 MG/DL
HDLC SERPL-MCNC: 51 MG/DL (ref 40–60)
HDLC SERPL: 3.5
LDLC SERPL CALC-MCNC: 83 MG/DL
TRIGL SERPL-MCNC: 210 MG/DL (ref 35–150)
VLDLC SERPL CALC-MCNC: 42 MG/DL (ref 6–23)

## 2024-04-05 PROCEDURE — 36415 COLL VENOUS BLD VENIPUNCTURE: CPT | Performed by: FAMILY MEDICINE

## 2024-04-11 ENCOUNTER — TELEMEDICINE (OUTPATIENT)
Dept: FAMILY MEDICINE CLINIC | Facility: CLINIC | Age: 54
End: 2024-04-11
Payer: COMMERCIAL

## 2024-04-11 DIAGNOSIS — R79.89 LOW VITAMIN D LEVEL: ICD-10-CM

## 2024-04-11 DIAGNOSIS — E78.01 FAMILIAL HYPERCHOLESTEROLEMIA: Primary | ICD-10-CM

## 2024-04-11 DIAGNOSIS — M85.80 OSTEOPENIA, UNSPECIFIED LOCATION: ICD-10-CM

## 2024-04-11 PROCEDURE — 99442 PR PHYS/QHP TELEPHONE EVALUATION 11-20 MIN: CPT | Performed by: FAMILY MEDICINE

## 2024-04-11 RX ORDER — ERGOCALCIFEROL 1.25 MG/1
50000 CAPSULE ORAL WEEKLY
Qty: 4 CAPSULE | Refills: 2 | Status: SHIPPED | OUTPATIENT
Start: 2024-04-11

## 2024-04-11 ASSESSMENT — ENCOUNTER SYMPTOMS
NAUSEA: 0
VOMITING: 0
SHORTNESS OF BREATH: 0

## 2024-04-11 NOTE — PROGRESS NOTES
PROGRESS NOTE    SUBJECTIVE:   Emily Wallis is a 53 y.o. female seen for a follow up visit regarding the following chief complaint:     Chief Complaint   Patient presents with    Follow-up           HPI patient is doing a phone call visit to go over lab results without any new complaintsEmily Wallis is a 53 y.o. female evaluated via telephone on 4/11/2024 for Follow-up  .        Total Time: minutes: 11-20 minutes    Emily Wallis was evaluated through a synchronous (real-time) audio encounter. Patient identification was verified at the start of the visit. She (or guardian if applicable) is aware that this is a billable service, which includes applicable co-pays. This visit was conducted with the patient's (and/or legal guardian's) verbal consent. She has not had a related appointment within my department in the past 7 days or scheduled within the next 24 hours.   The patient was located at Home: [address] [place] [id number].  The provider was located at Facility (Appt Dept): [address] [place] [address] Dr Harper [address] [place] [phone].    Note: not billable if this call serves to triage the patient into an appointment for the relevant concern         Past Medical History, Past Surgical History, Family history, Social History, and Medications were all reviewed with the patient today and updated as necessary.       Current Outpatient Medications   Medication Sig Dispense Refill    vitamin D (ERGOCALCIFEROL) 1.25 MG (04346 UT) CAPS capsule Take 1 capsule by mouth Once a week at 5 PM 4 capsule 2    rosuvastatin (CRESTOR) 20 MG tablet Take 1 tablet by mouth every morning 90 tablet 4    valACYclovir (VALTREX) 1 g tablet Take 2 tablets by mouth 2 times daily 6 tablet 5    albuterol sulfate HFA (PROVENTIL;VENTOLIN;PROAIR) 108 (90 Base) MCG/ACT inhaler Inhale 2 puffs into the lungs every 4 hours as needed for Wheezing 18 g 5    montelukast (SINGULAIR) 10 MG tablet Take 1 tablet by mouth nightly TAKE 1

## 2024-05-02 DIAGNOSIS — J45.31 MILD PERSISTENT ASTHMA WITH (ACUTE) EXACERBATION: ICD-10-CM

## 2024-05-02 RX ORDER — ALBUTEROL SULFATE 90 UG/1
AEROSOL, METERED RESPIRATORY (INHALATION)
Qty: 8.5 EACH | Refills: 5 | Status: SHIPPED | OUTPATIENT
Start: 2024-05-02

## 2024-06-14 ENCOUNTER — APPOINTMENT (OUTPATIENT)
Dept: GENERAL RADIOLOGY | Age: 54
End: 2024-06-14
Payer: COMMERCIAL

## 2024-06-14 ENCOUNTER — HOSPITAL ENCOUNTER (EMERGENCY)
Age: 54
Discharge: HOME | End: 2024-06-14
Attending: EMERGENCY MEDICINE
Payer: COMMERCIAL

## 2024-06-14 ENCOUNTER — TELEPHONE (OUTPATIENT)
Dept: FAMILY MEDICINE CLINIC | Facility: CLINIC | Age: 54
End: 2024-06-14

## 2024-06-14 VITALS
DIASTOLIC BLOOD PRESSURE: 81 MMHG | RESPIRATION RATE: 16 BRPM | HEART RATE: 64 BPM | OXYGEN SATURATION: 99 % | SYSTOLIC BLOOD PRESSURE: 136 MMHG | TEMPERATURE: 98.1 F

## 2024-06-14 DIAGNOSIS — R06.89 DYSPNEA AND RESPIRATORY ABNORMALITIES: Primary | ICD-10-CM

## 2024-06-14 DIAGNOSIS — R06.00 DYSPNEA AND RESPIRATORY ABNORMALITIES: Primary | ICD-10-CM

## 2024-06-14 LAB
ALBUMIN SERPL-MCNC: 3.8 G/DL (ref 3.5–5)
ALBUMIN/GLOB SERPL: 1.2 (ref 1–1.9)
ALP SERPL-CCNC: 66 U/L (ref 35–104)
ALT SERPL-CCNC: 36 U/L (ref 12–65)
ANION GAP SERPL CALC-SCNC: 9 MMOL/L (ref 9–18)
AST SERPL-CCNC: 88 U/L (ref 15–37)
BASOPHILS # BLD: 0 K/UL (ref 0–0.2)
BASOPHILS NFR BLD: 1 % (ref 0–2)
BILIRUB SERPL-MCNC: 0.3 MG/DL (ref 0–1.2)
BUN SERPL-MCNC: 14 MG/DL (ref 6–23)
CALCIUM SERPL-MCNC: 9.2 MG/DL (ref 8.8–10.2)
CHLORIDE SERPL-SCNC: 105 MMOL/L (ref 98–107)
CO2 SERPL-SCNC: 22 MMOL/L (ref 20–28)
CREAT SERPL-MCNC: 0.74 MG/DL (ref 0.6–1.1)
DIFFERENTIAL METHOD BLD: NORMAL
EOSINOPHIL NFR BLD: 3 % (ref 0.5–7.8)
ERYTHROCYTE [DISTWIDTH] IN BLOOD BY AUTOMATED COUNT: 13.1 % (ref 11.9–14.6)
GLOBULIN SER CALC-MCNC: 3.3 G/DL (ref 2.3–3.5)
GLUCOSE SERPL-MCNC: 121 MG/DL (ref 70–99)
HCT VFR BLD AUTO: 42 % (ref 35.8–46.3)
HGB BLD-MCNC: 14.2 G/DL (ref 11.7–15.4)
IMM GRANULOCYTES # BLD AUTO: 0 K/UL (ref 0–0.5)
IMM GRANULOCYTES NFR BLD AUTO: 0 % (ref 0–5)
LYMPHOCYTES # BLD: 3.1 K/UL (ref 0.5–4.6)
LYMPHOCYTES NFR BLD: 42 % (ref 13–44)
MAGNESIUM SERPL-MCNC: 2.1 MG/DL (ref 1.8–2.4)
MCH RBC QN AUTO: 30.2 PG (ref 26.1–32.9)
MCHC RBC AUTO-ENTMCNC: 33.8 G/DL (ref 31.4–35)
MCV RBC AUTO: 89.4 FL (ref 82–102)
MONOCYTES # BLD: 0.5 K/UL (ref 0.1–1.3)
MONOCYTES NFR BLD: 7 % (ref 4–12)
NEUTS SEG # BLD: 3.5 K/UL (ref 1.7–8.2)
NEUTS SEG NFR BLD: 47 % (ref 43–78)
NRBC # BLD: 0 K/UL (ref 0–0.2)
NT PRO BNP: 76 PG/ML (ref 0–125)
PLATELET # BLD AUTO: 224 K/UL (ref 150–450)
PMV BLD AUTO: 12.2 FL (ref 9.4–12.3)
POTASSIUM SERPL-SCNC: 3.9 MMOL/L (ref 3.5–5.1)
POTASSIUM SERPL-SCNC: ABNORMAL MMOL/L (ref 3.5–5.1)
POTASSIUM SERPL-SCNC: NORMAL MMOL/L (ref 3.5–5.1)
POTASSIUM SERPL-SCNC: NORMAL MMOL/L (ref 3.5–5.1)
PROT SERPL-MCNC: 7.1 G/DL (ref 6.3–8.2)
RBC # BLD AUTO: 4.7 M/UL (ref 4.05–5.2)
SODIUM SERPL-SCNC: 136 MMOL/L (ref 136–145)
TSH, 3RD GENERATION: 1.54 UIU/ML (ref 0.27–4.2)
WBC # BLD AUTO: 7.4 K/UL (ref 4.3–11.1)

## 2024-06-14 PROCEDURE — 84132 ASSAY OF SERUM POTASSIUM: CPT

## 2024-06-14 PROCEDURE — 83880 ASSAY OF NATRIURETIC PEPTIDE: CPT

## 2024-06-14 PROCEDURE — 84443 ASSAY THYROID STIM HORMONE: CPT

## 2024-06-14 PROCEDURE — 80053 COMPREHEN METABOLIC PANEL: CPT

## 2024-06-14 PROCEDURE — 71046 X-RAY EXAM CHEST 2 VIEWS: CPT

## 2024-06-14 PROCEDURE — 99284 EMERGENCY DEPT VISIT MOD MDM: CPT

## 2024-06-14 PROCEDURE — 83735 ASSAY OF MAGNESIUM: CPT

## 2024-06-14 PROCEDURE — 85025 COMPLETE CBC W/AUTO DIFF WBC: CPT

## 2024-06-14 ASSESSMENT — ENCOUNTER SYMPTOMS
NAUSEA: 0
COLOR CHANGE: 0
APNEA: 1
SORE THROAT: 0
DIARRHEA: 0
COUGH: 0
VOMITING: 0
EYE REDNESS: 0
ABDOMINAL PAIN: 0
SHORTNESS OF BREATH: 0
WHEEZING: 0

## 2024-06-14 ASSESSMENT — LIFESTYLE VARIABLES
HOW MANY STANDARD DRINKS CONTAINING ALCOHOL DO YOU HAVE ON A TYPICAL DAY: PATIENT DOES NOT DRINK
HOW OFTEN DO YOU HAVE A DRINK CONTAINING ALCOHOL: NEVER

## 2024-06-14 NOTE — TELEPHONE ENCOUNTER
Patient called c/o leg pain, fatigue, SOB, neck pain and heart palpitations. Patient notified to go to ER now. Patient verbalized understanding.

## 2024-06-14 NOTE — ED TRIAGE NOTES
Patient is being sent here from her provider due to patient not breathing at night while sleeping. Patient wake up tired, neck pain, and heart feels funny. Patient reports of feeling foggy. Patient also reports of feeling tired all day long and body feels like it's swollen. Muscle pain.    Little interest or pleasure in doing things.

## 2024-06-15 NOTE — ED PROVIDER NOTES
for orders placed or performed during the hospital encounter of 06/14/24   XR CHEST (2 VW)    Narrative    Chest X-ray    INDICATION:  Sob      COMPARISON:  None    TECHNIQUE: PA and lateral views of the chest were obtained.    FINDINGS: The lungs are clear. There are no infiltrates or effusions.  The heart  size is normal.  The bony thorax is intact.        Impression    No acute findings in the chest      Electronically signed by Dorothy Matamoros   CBC with Auto Differential   Result Value Ref Range    WBC 7.4 4.3 - 11.1 K/uL    RBC 4.70 4.05 - 5.2 M/uL    Hemoglobin 14.2 11.7 - 15.4 g/dL    Hematocrit 42.0 35.8 - 46.3 %    MCV 89.4 82 - 102 FL    MCH 30.2 26.1 - 32.9 PG    MCHC 33.8 31.4 - 35.0 g/dL    RDW 13.1 11.9 - 14.6 %    Platelets 224 150 - 450 K/uL    MPV 12.2 9.4 - 12.3 FL    nRBC 0.00 0.0 - 0.2 K/uL    Differential Type AUTOMATED      Neutrophils % 47 43 - 78 %    Lymphocytes % 42 13 - 44 %    Monocytes % 7 4.0 - 12.0 %    Eosinophils % 3 0.5 - 7.8 %    Basophils % 1 0.0 - 2.0 %    Immature Granulocytes % 0 0.0 - 5.0 %    Neutrophils Absolute 3.5 1.7 - 8.2 K/UL    Lymphocytes Absolute 3.1 0.5 - 4.6 K/UL    Monocytes Absolute 0.5 0.1 - 1.3 K/UL    Eosinophils Absolute 0.2 0.0 - 0.8 K/UL    Basophils Absolute 0.0 0.0 - 0.2 K/UL    Immature Granulocytes Absolute 0.0 0.0 - 0.5 K/UL   Comprehensive Metabolic Panel   Result Value Ref Range    Sodium 136 136 - 145 mmol/L    Potassium HEMOLYZED SPECIMEN 3.5 - 5.1 mmol/L    Chloride 105 98 - 107 mmol/L    CO2 22 20 - 28 mmol/L    Anion Gap 9 9 - 18 mmol/L    Glucose 121 (H) 70 - 99 mg/dL    BUN 14 6 - 23 MG/DL    Creatinine 0.74 0.60 - 1.10 MG/DL    Est, Glom Filt Rate >90 >60 ml/min/1.73m2    Calcium 9.2 8.8 - 10.2 MG/DL    Total Bilirubin 0.3 0.0 - 1.2 MG/DL    ALT 36 12 - 65 U/L    AST 88 (H) 15 - 37 U/L    Alk Phosphatase 66 35 - 104 U/L    Total Protein 7.1 6.3 - 8.2 g/dL    Albumin 3.8 3.5 - 5.0 g/dL    Globulin 3.3 2.3 - 3.5 g/dL    Albumin/Globulin Ratio 1.2

## 2024-06-15 NOTE — DISCHARGE INSTRUCTIONS
As we discussed, we did not find the exact cause of your symptoms but sleep apnea seems like a significant possibility.  I encourage you to discuss with your primary care doctor diagnostic options for sleep apnea.    Please return with any fevers, vomiting, worsening symptoms, or additional concerns.

## 2024-06-15 NOTE — ED NOTES
Patient mobility status  with no difficulty. Provider aware     I have reviewed discharge instructions with the patient.  The patient verbalized understanding.    Patient left ED via Discharge Method: ambulatory to Home with self.    Opportunity for questions and clarification provided.     Patient given 0 scripts.           Naveen Weiss RN  06/14/24 [id number]

## 2024-06-15 NOTE — ED NOTES
MD notified of [facility] potassium level of 6.5. MD stated to redraw another potassium level and send it to lab.      Naveen Weiss RN  06/14/24 [id number]

## 2024-06-17 LAB
BUN BLD-MCNC: 16 MG/DL (ref 8–26)
CO2 BLD-SCNC: 26.8 MMOL/L (ref 21–32)
CREAT BLD-MCNC: 0.71 MG/DL (ref 0.8–1.5)
GLUCOSE BLD-MCNC: 111 MG/DL (ref 65–100)
POTASSIUM BLD-SCNC: 6.5 MMOL/L (ref 3.5–5.1)
SODIUM BLD-SCNC: 141 MMOL/L (ref 136–145)

## 2024-07-22 DIAGNOSIS — M85.80 OSTEOPENIA, UNSPECIFIED LOCATION: ICD-10-CM

## 2024-07-22 DIAGNOSIS — R79.89 LOW VITAMIN D LEVEL: ICD-10-CM

## 2024-07-23 RX ORDER — ERGOCALCIFEROL 1.25 MG/1
50000 CAPSULE ORAL WEEKLY
Qty: 4 CAPSULE | Refills: 5 | Status: SHIPPED | OUTPATIENT
Start: 2024-07-23

## 2024-09-04 ENCOUNTER — OFFICE VISIT (OUTPATIENT)
Dept: FAMILY MEDICINE CLINIC | Facility: CLINIC | Age: 54
End: 2024-09-04
Payer: COMMERCIAL

## 2024-09-04 VITALS
WEIGHT: 199 LBS | BODY MASS INDEX: 35.26 KG/M2 | DIASTOLIC BLOOD PRESSURE: 70 MMHG | SYSTOLIC BLOOD PRESSURE: 120 MMHG | HEIGHT: 63 IN

## 2024-09-04 DIAGNOSIS — J30.89 OTHER ALLERGIC RHINITIS: ICD-10-CM

## 2024-09-04 DIAGNOSIS — G47.33 OBSTRUCTIVE SLEEP APNEA: Primary | ICD-10-CM

## 2024-09-04 DIAGNOSIS — F41.9 ANXIETY: ICD-10-CM

## 2024-09-04 DIAGNOSIS — L65.9 ALOPECIA: ICD-10-CM

## 2024-09-04 DIAGNOSIS — F33.1 MODERATE EPISODE OF RECURRENT MAJOR DEPRESSIVE DISORDER (HCC): ICD-10-CM

## 2024-09-04 DIAGNOSIS — E66.01 SEVERE OBESITY (BMI 35.0-39.9) WITH COMORBIDITY (HCC): ICD-10-CM

## 2024-09-04 LAB — TSH W FREE THYROID IF ABNORMAL: 1.86 UIU/ML (ref 0.27–4.2)

## 2024-09-04 PROCEDURE — 99215 OFFICE O/P EST HI 40 MIN: CPT | Performed by: FAMILY MEDICINE

## 2024-09-04 RX ORDER — MINOXIDIL 2 %
SOLUTION, NON-ORAL TOPICAL
Qty: 60 G | Refills: 5 | Status: SHIPPED | OUTPATIENT
Start: 2024-09-04

## 2024-09-04 SDOH — ECONOMIC STABILITY: FOOD INSECURITY: WITHIN THE PAST 12 MONTHS, THE FOOD YOU BOUGHT JUST DIDN'T LAST AND YOU DIDN'T HAVE MONEY TO GET MORE.: NEVER TRUE

## 2024-09-04 SDOH — ECONOMIC STABILITY: INCOME INSECURITY: HOW HARD IS IT FOR YOU TO PAY FOR THE VERY BASICS LIKE FOOD, HOUSING, MEDICAL CARE, AND HEATING?: NOT HARD AT ALL

## 2024-09-04 SDOH — ECONOMIC STABILITY: FOOD INSECURITY: WITHIN THE PAST 12 MONTHS, YOU WORRIED THAT YOUR FOOD WOULD RUN OUT BEFORE YOU GOT MONEY TO BUY MORE.: NEVER TRUE

## 2024-09-04 ASSESSMENT — PATIENT HEALTH QUESTIONNAIRE - PHQ9
SUM OF ALL RESPONSES TO PHQ QUESTIONS 1-9: 0
8. MOVING OR SPEAKING SO SLOWLY THAT OTHER PEOPLE COULD HAVE NOTICED. OR THE OPPOSITE, BEING SO FIGETY OR RESTLESS THAT YOU HAVE BEEN MOVING AROUND A LOT MORE THAN USUAL: NOT AT ALL
1. LITTLE INTEREST OR PLEASURE IN DOING THINGS: NOT AT ALL
7. TROUBLE CONCENTRATING ON THINGS, SUCH AS READING THE NEWSPAPER OR WATCHING TELEVISION: NOT AT ALL
SUM OF ALL RESPONSES TO PHQ QUESTIONS 1-9: 0
4. FEELING TIRED OR HAVING LITTLE ENERGY: NOT AT ALL
2. FEELING DOWN, DEPRESSED OR HOPELESS: NOT AT ALL
SUM OF ALL RESPONSES TO PHQ9 QUESTIONS 1 & 2: 0
SUM OF ALL RESPONSES TO PHQ QUESTIONS 1-9: 0
SUM OF ALL RESPONSES TO PHQ QUESTIONS 1-9: 0
5. POOR APPETITE OR OVEREATING: NOT AT ALL
3. TROUBLE FALLING OR STAYING ASLEEP: NOT AT ALL
9. THOUGHTS THAT YOU WOULD BE BETTER OFF DEAD, OR OF HURTING YOURSELF: NOT AT ALL
10. IF YOU CHECKED OFF ANY PROBLEMS, HOW DIFFICULT HAVE THESE PROBLEMS MADE IT FOR YOU TO DO YOUR WORK, TAKE CARE OF THINGS AT HOME, OR GET ALONG WITH OTHER PEOPLE: NOT DIFFICULT AT ALL
6. FEELING BAD ABOUT YOURSELF - OR THAT YOU ARE A FAILURE OR HAVE LET YOURSELF OR YOUR FAMILY DOWN: NOT AT ALL

## 2024-09-04 ASSESSMENT — ENCOUNTER SYMPTOMS
SHORTNESS OF BREATH: 0
ROS SKIN COMMENTS: ALOPECIA
SINUS PAIN: 0
COUGH: 0
RHINORRHEA: 0
ABDOMINAL PAIN: 0
DIARRHEA: 0

## 2024-09-04 NOTE — PROGRESS NOTES
facility-administered medications for this visit.     Allergies   Allergen Reactions    Azithromycin Hives     Patient Active Problem List   Diagnosis    History of uterine fibroid    LPRD (laryngopharyngeal reflux disease)    Fibroids    Chronic pelvic pain in female    Vaginal bleeding    Disorder of vocal cords    Family history of ischemic heart disease (IHD)    Vulvovaginal candidiasis    Hot flashes due to menopause    Dyspareunia in female    Environmental and seasonal allergies    Bacterial vaginosis    Fibromyalgia    Precordial pain    Pure hypercholesterolemia    Vocal cord dysfunction    Mild persistent asthma with acute exacerbation    Hemoptysis    Hyperplastic polyp of ascending colon    Vasomotor rhinitis    Liver hemangioma     Past Medical History:   Diagnosis Date    GERD (gastroesophageal reflux disease)     Pure hypercholesterolemia 5/3/2016    Vocal cord dysfunction      Past Surgical History:   Procedure Laterality Date    BRONCHOSCOPY  09/2016    for hemoptysis--negative    CHOLECYSTECTOMY      COLONOSCOPY N/A 6/17/2021    COLONOSCOPY/ BMI 35 performed by Jean Maria MD at [facility] ENDOSCOPY    COLONOSCOPY,MICHAEL MORALES/CAUTERY  6/20/2021         GYN      tubal ligation    LARYNGECTOMY  08/26/15    ROTATOR CUFF REPAIR Right      Family History   Problem Relation Age of Onset    Coronary Art Dis Mother     Coronary Art Dis Father      Social History     Tobacco Use    Smoking status: Never     Passive exposure: Never    Smokeless tobacco: Never   Substance Use Topics    Alcohol use: Not Currently     Alcohol/week: 0.0 standard drinks of alcohol         Review of Systems   Constitutional:  Negative for chills, fatigue and fever.   HENT:  Negative for congestion, rhinorrhea and sinus pain.    Eyes:  Negative for visual disturbance.   Respiratory:  Negative for cough and shortness of breath.    Cardiovascular:  Negative for chest pain.   Gastrointestinal:  Negative for abdominal pain and

## 2024-09-19 ENCOUNTER — TELEMEDICINE (OUTPATIENT)
Dept: FAMILY MEDICINE CLINIC | Facility: CLINIC | Age: 54
End: 2024-09-19
Payer: COMMERCIAL

## 2024-09-19 DIAGNOSIS — E78.01 FAMILIAL HYPERCHOLESTEROLEMIA: ICD-10-CM

## 2024-09-19 DIAGNOSIS — L21.9 SEBORRHEIC DERMATITIS: Primary | ICD-10-CM

## 2024-09-19 DIAGNOSIS — K21.9 GASTRO-ESOPHAGEAL REFLUX DISEASE WITHOUT ESOPHAGITIS: ICD-10-CM

## 2024-09-19 DIAGNOSIS — F33.1 MODERATE EPISODE OF RECURRENT MAJOR DEPRESSIVE DISORDER (HCC): ICD-10-CM

## 2024-09-19 DIAGNOSIS — J30.89 OTHER ALLERGIC RHINITIS: ICD-10-CM

## 2024-09-19 PROCEDURE — 99443 PR PHYS/QHP TELEPHONE EVALUATION 21-30 MIN: CPT | Performed by: FAMILY MEDICINE

## 2024-09-19 RX ORDER — OMEPRAZOLE 40 MG/1
40 CAPSULE, DELAYED RELEASE ORAL DAILY
Qty: 90 CAPSULE | Refills: 3 | Status: SHIPPED | OUTPATIENT
Start: 2024-09-19 | End: 2024-12-18

## 2024-09-19 RX ORDER — MONTELUKAST SODIUM 10 MG/1
10 TABLET ORAL NIGHTLY
Qty: 90 TABLET | Refills: 3 | Status: SHIPPED | OUTPATIENT
Start: 2024-09-19 | End: 2024-12-18

## 2024-09-19 RX ORDER — SERTRALINE HYDROCHLORIDE 100 MG/1
TABLET, FILM COATED ORAL
Qty: 90 TABLET | Refills: 3 | Status: SHIPPED | OUTPATIENT
Start: 2024-09-19

## 2024-09-19 RX ORDER — ROSUVASTATIN CALCIUM 20 MG/1
20 TABLET, COATED ORAL EVERY MORNING
Qty: 90 TABLET | Refills: 3 | Status: SHIPPED | OUTPATIENT
Start: 2024-09-19

## 2024-09-19 ASSESSMENT — ENCOUNTER SYMPTOMS
NAUSEA: 0
VOMITING: 0
ROS SKIN COMMENTS: ITCHY SCALP
SHORTNESS OF BREATH: 0

## 2024-10-28 ENCOUNTER — TELEPHONE (OUTPATIENT)
Dept: FAMILY MEDICINE CLINIC | Facility: CLINIC | Age: 54
End: 2024-10-28

## 2024-10-28 DIAGNOSIS — G47.30 SLEEP APNEA, UNSPECIFIED TYPE: Primary | ICD-10-CM

## 2024-10-29 ENCOUNTER — LAB (OUTPATIENT)
Dept: FAMILY MEDICINE CLINIC | Facility: CLINIC | Age: 54
End: 2024-10-29
Payer: COMMERCIAL

## 2024-10-29 DIAGNOSIS — J30.89 OTHER ALLERGIC RHINITIS: ICD-10-CM

## 2024-10-29 DIAGNOSIS — R79.89 LOW VITAMIN D LEVEL: ICD-10-CM

## 2024-10-29 DIAGNOSIS — Z00.00 LABORATORY EXAM ORDERED AS PART OF ROUTINE GENERAL MEDICAL EXAMINATION: Primary | ICD-10-CM

## 2024-10-29 LAB
25(OH)D3 SERPL-MCNC: 35.1 NG/ML (ref 30–100)
ALBUMIN SERPL-MCNC: 3.9 G/DL (ref 3.5–5)
ALBUMIN/GLOB SERPL: 1.3 (ref 1–1.9)
ALP SERPL-CCNC: 81 U/L (ref 35–104)
ALT SERPL-CCNC: 32 U/L (ref 8–45)
ANION GAP SERPL CALC-SCNC: 11 MMOL/L (ref 7–16)
AST SERPL-CCNC: 22 U/L (ref 15–37)
BILIRUB SERPL-MCNC: 0.4 MG/DL (ref 0–1.2)
BILIRUBIN, URINE, POC: NEGATIVE
BLOOD URINE, POC: NEGATIVE
BUN SERPL-MCNC: 14 MG/DL (ref 6–23)
CALCIUM SERPL-MCNC: 9.7 MG/DL (ref 8.8–10.2)
CHLORIDE SERPL-SCNC: 107 MMOL/L (ref 98–107)
CHOLEST SERPL-MCNC: 207 MG/DL (ref 0–200)
CO2 SERPL-SCNC: 26 MMOL/L (ref 20–29)
CREAT SERPL-MCNC: 0.62 MG/DL (ref 0.6–1.1)
GLOBULIN SER CALC-MCNC: 3 G/DL (ref 2.3–3.5)
GLUCOSE SERPL-MCNC: 105 MG/DL (ref 70–99)
GLUCOSE URINE, POC: NEGATIVE
GRANS ABSOLUTE, POC: 3.2 K/UL
GRANULOCYTES %, POC: 45 %
HDLC SERPL-MCNC: 60 MG/DL (ref 40–60)
HDLC SERPL: 3.4 (ref 0–5)
HEMATOCRIT, POC: 44.6 %
HEMOGLOBIN, POC: 14.3 G/DL
KETONES, URINE, POC: NEGATIVE
LDLC SERPL CALC-MCNC: 119 MG/DL (ref 0–100)
LEUKOCYTE ESTERASE, URINE, POC: NEGATIVE
LYMPHOCYTE %, POC: 47 %
LYMPHS ABSOLUTE, POC: 3.3 K/UL
MCH, POC: NORMAL PG (ref 40–?)
MCHC, POC: 32.1
MCV, POC: 96.6
MONOCYTE %, POC: 8 %
MONOCYTE, ABSOLUTE POC: 0.6 K/UL
MPV, POC: 8.7 FL
NITRITE, URINE, POC: NEGATIVE
PH, URINE, POC: 5.5 (ref 4.6–8)
PLATELET COUNT, POC: 282 K/UL
POTASSIUM SERPL-SCNC: 4.5 MMOL/L (ref 3.5–5.1)
PROT SERPL-MCNC: 6.8 G/DL (ref 6.3–8.2)
PROTEIN,URINE, POC: NEGATIVE
RBC, POC: 4.62 M/UL
RDW, POC: 13.5 %
SODIUM SERPL-SCNC: 144 MMOL/L (ref 136–145)
SPECIFIC GRAVITY, URINE, POC: 1.02 (ref 1–1.03)
TRIGL SERPL-MCNC: 140 MG/DL (ref 0–150)
TSH, 3RD GENERATION: 2.11 UIU/ML (ref 0.27–4.2)
URINALYSIS CLARITY, POC: CLEAR
URINALYSIS COLOR, POC: YELLOW
UROBILINOGEN, POC: NORMAL
VLDLC SERPL CALC-MCNC: 28 MG/DL (ref 6–23)
WBC, POC: 7 K/UL

## 2024-10-29 PROCEDURE — 81003 URINALYSIS AUTO W/O SCOPE: CPT | Performed by: FAMILY MEDICINE

## 2024-10-29 PROCEDURE — 85025 COMPLETE CBC W/AUTO DIFF WBC: CPT | Performed by: FAMILY MEDICINE

## 2024-10-29 RX ORDER — FLUTICASONE PROPIONATE 50 MCG
SPRAY, SUSPENSION (ML) NASAL
Qty: 1 EACH | Refills: 5 | Status: SHIPPED | OUTPATIENT
Start: 2024-10-29

## 2024-11-12 DIAGNOSIS — J30.89 OTHER ALLERGIC RHINITIS: ICD-10-CM

## 2024-11-13 RX ORDER — FLUTICASONE PROPIONATE 50 MCG
SPRAY, SUSPENSION (ML) NASAL
Refills: 2 | OUTPATIENT
Start: 2024-11-13

## 2024-11-14 ENCOUNTER — OFFICE VISIT (OUTPATIENT)
Dept: FAMILY MEDICINE CLINIC | Facility: CLINIC | Age: 54
End: 2024-11-14

## 2024-11-14 VITALS
SYSTOLIC BLOOD PRESSURE: 122 MMHG | BODY MASS INDEX: 35.26 KG/M2 | DIASTOLIC BLOOD PRESSURE: 80 MMHG | HEART RATE: 76 BPM | HEIGHT: 63 IN | WEIGHT: 199 LBS

## 2024-11-14 DIAGNOSIS — Z13.31 SCREENING FOR DEPRESSION: ICD-10-CM

## 2024-11-14 DIAGNOSIS — M54.50 ACUTE BILATERAL LOW BACK PAIN WITHOUT SCIATICA: ICD-10-CM

## 2024-11-14 DIAGNOSIS — R79.89 LOW VITAMIN D LEVEL: ICD-10-CM

## 2024-11-14 DIAGNOSIS — Z12.31 OTHER SCREENING MAMMOGRAM: ICD-10-CM

## 2024-11-14 DIAGNOSIS — L65.9 ALOPECIA: ICD-10-CM

## 2024-11-14 DIAGNOSIS — E78.01 FAMILIAL HYPERCHOLESTEROLEMIA: ICD-10-CM

## 2024-11-14 DIAGNOSIS — F33.1 MODERATE EPISODE OF RECURRENT MAJOR DEPRESSIVE DISORDER (HCC): ICD-10-CM

## 2024-11-14 DIAGNOSIS — K21.9 GASTRO-ESOPHAGEAL REFLUX DISEASE WITHOUT ESOPHAGITIS: ICD-10-CM

## 2024-11-14 DIAGNOSIS — G47.33 OBSTRUCTIVE SLEEP APNEA: ICD-10-CM

## 2024-11-14 DIAGNOSIS — M85.80 OSTEOPENIA, UNSPECIFIED LOCATION: ICD-10-CM

## 2024-11-14 DIAGNOSIS — J30.89 OTHER ALLERGIC RHINITIS: ICD-10-CM

## 2024-11-14 DIAGNOSIS — B00.1 HERPES LABIALIS: ICD-10-CM

## 2024-11-14 DIAGNOSIS — L21.9 SEBORRHEIC DERMATITIS: ICD-10-CM

## 2024-11-14 DIAGNOSIS — Z00.00 ROUTINE GENERAL MEDICAL EXAMINATION AT A HEALTH CARE FACILITY: Primary | ICD-10-CM

## 2024-11-14 DIAGNOSIS — J45.31 MILD PERSISTENT ASTHMA WITH (ACUTE) EXACERBATION: ICD-10-CM

## 2024-11-14 RX ORDER — ROSUVASTATIN CALCIUM 20 MG/1
20 TABLET, COATED ORAL EVERY MORNING
Qty: 90 TABLET | Refills: 3 | Status: SHIPPED | OUTPATIENT
Start: 2024-11-14

## 2024-11-14 RX ORDER — FLUTICASONE PROPIONATE 50 MCG
SPRAY, SUSPENSION (ML) NASAL
Qty: 1 EACH | Refills: 5 | Status: SHIPPED | OUTPATIENT
Start: 2024-11-14

## 2024-11-14 RX ORDER — ALBUTEROL SULFATE 90 UG/1
2 INHALANT RESPIRATORY (INHALATION) EVERY 6 HOURS PRN
Qty: 8.5 EACH | Refills: 5 | Status: SHIPPED | OUTPATIENT
Start: 2024-11-14

## 2024-11-14 RX ORDER — VALACYCLOVIR HYDROCHLORIDE 1 G/1
2000 TABLET, FILM COATED ORAL 2 TIMES DAILY
Qty: 6 TABLET | Refills: 5 | Status: SHIPPED | OUTPATIENT
Start: 2024-11-14

## 2024-11-14 RX ORDER — MONTELUKAST SODIUM 10 MG/1
10 TABLET ORAL NIGHTLY
Qty: 90 TABLET | Refills: 3 | Status: SHIPPED | OUTPATIENT
Start: 2024-11-14 | End: 2025-11-09

## 2024-11-14 RX ORDER — OMEPRAZOLE 40 MG/1
40 CAPSULE, DELAYED RELEASE ORAL DAILY
Qty: 90 CAPSULE | Refills: 3 | Status: SHIPPED | OUTPATIENT
Start: 2024-11-14 | End: 2025-11-09

## 2024-11-14 RX ORDER — ERGOCALCIFEROL 1.25 MG/1
50000 CAPSULE, LIQUID FILLED ORAL WEEKLY
Qty: 4 CAPSULE | Refills: 5 | Status: SHIPPED | OUTPATIENT
Start: 2024-11-14

## 2024-11-14 RX ORDER — SERTRALINE HYDROCHLORIDE 100 MG/1
TABLET, FILM COATED ORAL
Qty: 90 TABLET | Refills: 3 | Status: SHIPPED | OUTPATIENT
Start: 2024-11-14

## 2024-11-14 RX ORDER — MINOXIDIL 2 %
SOLUTION, NON-ORAL TOPICAL
Qty: 60 G | Refills: 5 | Status: SHIPPED | OUTPATIENT
Start: 2024-11-14

## 2024-11-14 RX ORDER — ERGOCALCIFEROL 1.25 MG/1
50000 CAPSULE, LIQUID FILLED ORAL WEEKLY
Qty: 12 CAPSULE | Refills: 1 | OUTPATIENT
Start: 2024-11-14

## 2024-11-14 ASSESSMENT — ENCOUNTER SYMPTOMS
SHORTNESS OF BREATH: 0
ABDOMINAL PAIN: 0
COUGH: 0

## 2024-11-14 NOTE — PROGRESS NOTES
Custom]           Acute bilateral low back pain without sciatica             Other screening mammogram        [facility] DIGITAL SCREEN W OR WO CAD BILATERAL [50565 CPT(R)]   - Future Order         Screening for depression                         Diagnosis Orders   1. Routine general medical examination at a health care facility        2. Mild persistent asthma with (acute) exacerbation  albuterol sulfate HFA (PROVENTIL;VENTOLIN;PROAIR) 108 (90 Base) MCG/ACT inhaler      3. Other allergic rhinitis  fluticasone (FLONASE) 50 MCG/ACT nasal spray    montelukast (SINGULAIR) 10 MG tablet      4. Alopecia  Minoxidil (ROGAINE WOMENS) 5 % FOAM      5. Gastro-esophageal reflux disease without esophagitis  omeprazole (PRILOSEC) 40 MG delayed release capsule      6. Familial hypercholesterolemia  rosuvastatin (CRESTOR) 20 MG tablet      7. Seborrheic dermatitis  selenium sulfide (SELSUN BLUE) 1 % shampoo      8. Moderate episode of recurrent major depressive disorder (HCC)  sertraline (ZOLOFT) 100 MG tablet      9. Herpes labialis  valACYclovir (VALTREX) 1 g tablet      10. Low vitamin D level  vitamin D (ERGOCALCIFEROL) 1.25 MG (65525 UT) CAPS capsule      11. Osteopenia, unspecified location  vitamin D (ERGOCALCIFEROL) 1.25 MG (46769 UT) CAPS capsule      12. Obstructive sleep apnea  [facility]      13. Acute bilateral low back pain without sciatica        14. Other screening mammogram  [facility] DIGITAL SCREEN W OR WO CAD BILATERAL      15. Screening for depression        , Emily was seen today for annual exam.    Diagnoses and all orders for this visit:    Routine general medical examination at a health care facility    Mild persistent asthma with (acute) exacerbation  -     albuterol sulfate HFA (PROVENTIL;VENTOLIN;PROAIR) 108 (90 Base) MCG/ACT inhaler; Inhale 2 puffs into the lungs every 6 hours as needed for Wheezing or Shortness of Breath    Other allergic rhinitis  -     fluticasone (FLONASE) 50 MCG/ACT

## 2024-11-27 ENCOUNTER — TELEPHONE (OUTPATIENT)
Dept: FAMILY MEDICINE CLINIC | Facility: CLINIC | Age: 54
End: 2024-11-27

## 2024-11-27 NOTE — TELEPHONE ENCOUNTER
Received a call from [facility] requesting a new order for home sleep study a need order to be fax [phone]

## 2025-01-12 ENCOUNTER — HOSPITAL ENCOUNTER (OUTPATIENT)
Dept: SLEEP MEDICINE | Age: 55
Discharge: HOME | End: 2025-01-15
Payer: COMMERCIAL

## 2025-01-12 PROCEDURE — 95810 POLYSOM 6/> YRS 4/> PARAM: CPT

## 2025-02-10 ENCOUNTER — TELEPHONE (OUTPATIENT)
Dept: SLEEP MEDICINE | Age: 55
End: 2025-02-10

## 2025-02-10 ENCOUNTER — TELEPHONE (OUTPATIENT)
Dept: FAMILY MEDICINE CLINIC | Facility: CLINIC | Age: 55
End: 2025-02-10

## 2025-02-10 NOTE — TELEPHONE ENCOUNTER
Patient needs New Pt PSG appt/ AHI - 1.3  Lowest SaO2- 88%-  Hypersomnia is out of proportion to study findings. Evaluate if patient has risk factors for narcolepsy and needs an MSLT       Forward to schedulers

## 2025-02-10 NOTE — TELEPHONE ENCOUNTER
Patient called asking for an appointment to go over sleep study results. Per dr. Palomino need to follow up with sleep doctor. Gave patient sleep doctor phone number to call and schedule. Patient verbalized understanding.

## 2025-02-12 ENCOUNTER — OFFICE VISIT (OUTPATIENT)
Dept: SLEEP MEDICINE | Age: 55
End: 2025-02-12
Payer: COMMERCIAL

## 2025-02-12 VITALS
HEART RATE: 76 BPM | WEIGHT: 198 LBS | HEIGHT: 63 IN | OXYGEN SATURATION: 97 % | DIASTOLIC BLOOD PRESSURE: 78 MMHG | RESPIRATION RATE: 17 BRPM | SYSTOLIC BLOOD PRESSURE: 118 MMHG | BODY MASS INDEX: 35.08 KG/M2

## 2025-02-12 DIAGNOSIS — G47.00 PERSISTENT DISORDER OF INITIATING OR MAINTAINING SLEEP: ICD-10-CM

## 2025-02-12 DIAGNOSIS — G25.81 RLS (RESTLESS LEGS SYNDROME): ICD-10-CM

## 2025-02-12 DIAGNOSIS — R06.81 WITNESSED APNEIC SPELLS: ICD-10-CM

## 2025-02-12 DIAGNOSIS — R29.818 SUSPECTED SLEEP APNEA: Primary | ICD-10-CM

## 2025-02-12 DIAGNOSIS — R06.83 SNORING: ICD-10-CM

## 2025-02-12 DIAGNOSIS — G47.8 NON-RESTORATIVE SLEEP: ICD-10-CM

## 2025-02-12 DIAGNOSIS — G47.10 HYPERSOMNIA: ICD-10-CM

## 2025-02-12 DIAGNOSIS — F45.8 BRUXISM (TEETH GRINDING): ICD-10-CM

## 2025-02-12 DIAGNOSIS — E66.9 OBESITY (BMI 35.0-39.9 WITHOUT COMORBIDITY): ICD-10-CM

## 2025-02-12 PROCEDURE — 99203 OFFICE O/P NEW LOW 30 MIN: CPT | Performed by: NURSE PRACTITIONER

## 2025-02-12 ASSESSMENT — SLEEP AND FATIGUE QUESTIONNAIRES
HOW LIKELY ARE YOU TO NOD OFF OR FALL ASLEEP WHILE SITTING AND READING: WOULD NEVER DOZE
HOW LIKELY ARE YOU TO NOD OFF OR FALL ASLEEP WHEN YOU ARE A PASSENGER IN A CAR FOR AN HOUR WITHOUT A BREAK: WOULD NEVER DOZE
HOW LIKELY ARE YOU TO NOD OFF OR FALL ASLEEP IN A CAR, WHILE STOPPED FOR A FEW MINUTES IN TRAFFIC: SLIGHT CHANCE OF DOZING
HOW LIKELY ARE YOU TO NOD OFF OR FALL ASLEEP WHILE WATCHING TV: HIGH CHANCE OF DOZING
HOW LIKELY ARE YOU TO NOD OFF OR FALL ASLEEP WHILE SITTING AND TALKING TO SOMEONE: MODERATE CHANCE OF DOZING
HOW LIKELY ARE YOU TO NOD OFF OR FALL ASLEEP WHILE SITTING INACTIVE IN A PUBLIC PLACE: HIGH CHANCE OF DOZING
HOW LIKELY ARE YOU TO NOD OFF OR FALL ASLEEP WHILE SITTING QUIETLY AFTER LUNCH WITHOUT ALCOHOL: MODERATE CHANCE OF DOZING
ESS TOTAL SCORE: 14
HOW LIKELY ARE YOU TO NOD OFF OR FALL ASLEEP WHILE LYING DOWN TO REST IN THE AFTERNOON WHEN CIRCUMSTANCES PERMIT: HIGH CHANCE OF DOZING

## 2025-02-12 NOTE — PATIENT INSTRUCTIONS
2 night home sleep study ordered  Recommendations as above  Follow-up after sleep study or 3 months after starting CPAP therapy or sooner if needed

## 2025-03-18 ENCOUNTER — HOSPITAL ENCOUNTER (OUTPATIENT)
Dept: SLEEP CENTER | Age: 55
Discharge: HOME | End: 2025-03-20

## 2025-04-03 ENCOUNTER — TELEPHONE (OUTPATIENT)
Dept: SLEEP MEDICINE | Age: 55
End: 2025-04-03

## 2025-04-03 DIAGNOSIS — G47.33 OSA (OBSTRUCTIVE SLEEP APNEA): Primary | ICD-10-CM

## 2025-04-03 NOTE — TELEPHONE ENCOUNTER
Patient had recent sleep study showing severe sleep apnea. Cpap therapy is recommended per sleep interp.  Patient has agreed to start CPAP therapy. Order sent to [facility].     QUYEN STARK RCP

## 2025-05-22 ENCOUNTER — OFFICE VISIT (OUTPATIENT)
Dept: FAMILY MEDICINE CLINIC | Facility: CLINIC | Age: 55
End: 2025-05-22
Payer: MEDICARE

## 2025-05-22 VITALS
DIASTOLIC BLOOD PRESSURE: 70 MMHG | WEIGHT: 200 LBS | BODY MASS INDEX: 35.44 KG/M2 | OXYGEN SATURATION: 98 % | SYSTOLIC BLOOD PRESSURE: 120 MMHG | HEART RATE: 72 BPM | HEIGHT: 63 IN

## 2025-05-22 DIAGNOSIS — R59.1 LYMPHADENOPATHY: Primary | ICD-10-CM

## 2025-05-22 DIAGNOSIS — M54.2 NECK PAIN: ICD-10-CM

## 2025-05-22 LAB
BASOPHILS # BLD: 0.03 K/UL (ref 0–0.2)
BASOPHILS NFR BLD: 0.4 % (ref 0–2)
DIFFERENTIAL METHOD BLD: ABNORMAL
EOSINOPHIL # BLD: 0.24 K/UL (ref 0–0.8)
EOSINOPHIL NFR BLD: 3.2 % (ref 0.5–7.8)
ERYTHROCYTE [DISTWIDTH] IN BLOOD BY AUTOMATED COUNT: 13.2 % (ref 11.9–14.6)
HCT VFR BLD AUTO: 46.8 % (ref 35.8–46.3)
HGB BLD-MCNC: 15.4 G/DL (ref 11.7–15.4)
IMM GRANULOCYTES # BLD AUTO: 0.01 K/UL (ref 0–0.5)
IMM GRANULOCYTES NFR BLD AUTO: 0.1 % (ref 0–5)
LYMPHOCYTES # BLD: 3.58 K/UL (ref 0.5–4.6)
LYMPHOCYTES NFR BLD: 47.3 % (ref 13–44)
MCH RBC QN AUTO: 30.1 PG (ref 26.1–32.9)
MCHC RBC AUTO-ENTMCNC: 32.9 G/DL (ref 31.4–35)
MCV RBC AUTO: 91.4 FL (ref 82–102)
MONOCYTES # BLD: 0.54 K/UL (ref 0.1–1.3)
MONOCYTES NFR BLD: 7.1 % (ref 4–12)
NEUTS SEG # BLD: 3.17 K/UL (ref 1.7–8.2)
NEUTS SEG NFR BLD: 41.9 % (ref 43–78)
NRBC # BLD: 0 K/UL (ref 0–0.2)
PLATELET # BLD AUTO: 215 K/UL (ref 150–450)
PMV BLD AUTO: 13 FL (ref 9.4–12.3)
RBC # BLD AUTO: 5.12 M/UL (ref 4.05–5.2)
WBC # BLD AUTO: 7.6 K/UL (ref 4.3–11.1)

## 2025-05-22 PROCEDURE — 99214 OFFICE O/P EST MOD 30 MIN: CPT | Performed by: FAMILY MEDICINE

## 2025-05-22 PROCEDURE — G8427 DOCREV CUR MEDS BY ELIG CLIN: HCPCS | Performed by: FAMILY MEDICINE

## 2025-05-22 PROCEDURE — 3017F COLORECTAL CA SCREEN DOC REV: CPT | Performed by: FAMILY MEDICINE

## 2025-05-22 PROCEDURE — 1036F TOBACCO NON-USER: CPT | Performed by: FAMILY MEDICINE

## 2025-05-22 PROCEDURE — G8417 CALC BMI ABV UP PARAM F/U: HCPCS | Performed by: FAMILY MEDICINE

## 2025-05-22 RX ORDER — CYCLOBENZAPRINE HCL 10 MG
10 TABLET ORAL NIGHTLY PRN
Qty: 10 TABLET | Refills: 0 | Status: SHIPPED | OUTPATIENT
Start: 2025-05-22 | End: 2025-06-01

## 2025-05-22 RX ORDER — NAPROXEN 500 MG/1
500 TABLET ORAL 2 TIMES DAILY WITH MEALS
Qty: 60 TABLET | Refills: 0 | Status: SHIPPED | OUTPATIENT
Start: 2025-05-22

## 2025-05-22 ASSESSMENT — ENCOUNTER SYMPTOMS
NAUSEA: 0
SHORTNESS OF BREATH: 0
VOMITING: 0

## 2025-05-22 NOTE — PROGRESS NOTES
PROGRESS NOTE    SUBJECTIVE:   Emily Wallis is a 54 y.o. female seen for a follow up visit regarding the following chief complaint:     Chief Complaint   Patient presents with    Mass     Lump neck right side on neck since saturday           HPI patient presents office today complaining of a lump on the side of her neck that started on Saturday apparently was much bigger and has gone down in size patient denies any fever chills weight loss she does admit to some neck pain with decreased range of motion and has been taking Flexeril that she had leftover      Past Medical History, Past Surgical History, Family history, Social History, and Medications were all reviewed with the patient today and updated as necessary.       Current Outpatient Medications   Medication Sig Dispense Refill    naproxen (NAPROSYN) 500 MG tablet Take 1 tablet by mouth 2 times daily (with meals) 60 tablet 0    cyclobenzaprine (FLEXERIL) 10 MG tablet Take 1 tablet by mouth nightly as needed for Muscle spasms 10 tablet 0    albuterol sulfate HFA (PROVENTIL;VENTOLIN;PROAIR) 108 (90 Base) MCG/ACT inhaler Inhale 2 puffs into the lungs every 6 hours as needed for Wheezing or Shortness of Breath 8.5 each 5    fluticasone (FLONASE) 50 MCG/ACT nasal spray SPRAY 1 SPRAY BY NASAL ROUTE IN THE MORNING 1 each 5    Minoxidil (ROGAINE WOMENS) 5 % FOAM Massage into scalp after shower then set dry daily 60 g 5    montelukast (SINGULAIR) 10 MG tablet Take 1 tablet by mouth nightly TAKE 1 TABLET BY MOUTH EVERY DAY 90 tablet 3    omeprazole (PRILOSEC) 40 MG delayed release capsule Take 1 capsule by mouth daily 90 capsule 3    rosuvastatin (CRESTOR) 20 MG tablet Take 1 tablet by mouth every morning 90 tablet 3    selenium sulfide (SELSUN BLUE) 1 % shampoo Apply topically daily as needed for Itching 118 mL 1    sertraline (ZOLOFT) 100 MG tablet Start with half a pill for 8 days then start 1 pill p.o. daily 90 tablet 3    valACYclovir (VALTREX) 1 g tablet Take

## 2025-06-04 ENCOUNTER — TELEMEDICINE (OUTPATIENT)
Dept: FAMILY MEDICINE CLINIC | Facility: CLINIC | Age: 55
End: 2025-06-04
Payer: MEDICARE

## 2025-06-04 DIAGNOSIS — R59.1 LYMPHADENOPATHY: Primary | ICD-10-CM

## 2025-06-04 PROCEDURE — 99213 OFFICE O/P EST LOW 20 MIN: CPT | Performed by: FAMILY MEDICINE

## 2025-06-04 SDOH — ECONOMIC STABILITY: FOOD INSECURITY: WITHIN THE PAST 12 MONTHS, THE FOOD YOU BOUGHT JUST DIDN'T LAST AND YOU DIDN'T HAVE MONEY TO GET MORE.: NEVER TRUE

## 2025-06-04 SDOH — ECONOMIC STABILITY: FOOD INSECURITY: WITHIN THE PAST 12 MONTHS, YOU WORRIED THAT YOUR FOOD WOULD RUN OUT BEFORE YOU GOT MONEY TO BUY MORE.: NEVER TRUE

## 2025-06-04 ASSESSMENT — ENCOUNTER SYMPTOMS
VOMITING: 0
SHORTNESS OF BREATH: 0
NAUSEA: 0

## 2025-06-04 ASSESSMENT — PATIENT HEALTH QUESTIONNAIRE - PHQ9
3. TROUBLE FALLING OR STAYING ASLEEP: NOT AT ALL
SUM OF ALL RESPONSES TO PHQ QUESTIONS 1-9: 0
4. FEELING TIRED OR HAVING LITTLE ENERGY: NOT AT ALL
1. LITTLE INTEREST OR PLEASURE IN DOING THINGS: NOT AT ALL
8. MOVING OR SPEAKING SO SLOWLY THAT OTHER PEOPLE COULD HAVE NOTICED. OR THE OPPOSITE, BEING SO FIGETY OR RESTLESS THAT YOU HAVE BEEN MOVING AROUND A LOT MORE THAN USUAL: NOT AT ALL
SUM OF ALL RESPONSES TO PHQ QUESTIONS 1-9: 0
7. TROUBLE CONCENTRATING ON THINGS, SUCH AS READING THE NEWSPAPER OR WATCHING TELEVISION: NOT AT ALL
5. POOR APPETITE OR OVEREATING: NOT AT ALL
SUM OF ALL RESPONSES TO PHQ QUESTIONS 1-9: 0
SUM OF ALL RESPONSES TO PHQ QUESTIONS 1-9: 0
2. FEELING DOWN, DEPRESSED OR HOPELESS: NOT AT ALL
9. THOUGHTS THAT YOU WOULD BE BETTER OFF DEAD, OR OF HURTING YOURSELF: NOT AT ALL
10. IF YOU CHECKED OFF ANY PROBLEMS, HOW DIFFICULT HAVE THESE PROBLEMS MADE IT FOR YOU TO DO YOUR WORK, TAKE CARE OF THINGS AT HOME, OR GET ALONG WITH OTHER PEOPLE: NOT DIFFICULT AT ALL

## 2025-06-04 NOTE — PROGRESS NOTES
PROGRESS NOTE    SUBJECTIVE:   Emily Wallis is a 54 y.o. female seen for a follow up visit regarding the following chief complaint:     Chief Complaint   Patient presents with    Follow-up           HPI patient is doing a follow-up of her lab results secondary to an inflamed lymph node on her neck which she states is going down but is still there.Emily Wallis is a 54 y.o. female evaluated via telephone on 6/4/2025 for Follow-up  .      Actual time 22 minutes  Total Time: minutes: 21-30 minutes    Emily Wallis was evaluated through a synchronous (real-time) audio encounter. Patient identification was verified at the start of the visit. She (or guardian if applicable) is aware that this is a billable service, which includes applicable co-pays. This visit was conducted with the patient's (and/or legal guardian's) verbal consent. She has not had a related appointment within my department in the past 7 days or scheduled within the next 24 hours.   The patient was located at Home: [address] [place] [phone].  The provider was located at Facility (Appt Dept): [address] [place] [address] Dr Harper [address] [place] [phone].    Note: not billable if this call serves to triage the patient into an appointment for the relevant concern         Past Medical History, Past Surgical History, Family history, Social History, and Medications were all reviewed with the patient today and updated as necessary.       Current Outpatient Medications   Medication Sig Dispense Refill    naproxen (NAPROSYN) 500 MG tablet Take 1 tablet by mouth 2 times daily (with meals) 60 tablet 0    albuterol sulfate HFA (PROVENTIL;VENTOLIN;PROAIR) 108 (90 Base) MCG/ACT inhaler Inhale 2 puffs into the lungs every 6 hours as needed for Wheezing or Shortness of Breath 8.5 each 5    fluticasone (FLONASE) 50 MCG/ACT nasal spray SPRAY 1 SPRAY BY NASAL ROUTE IN THE MORNING 1 each 5    Minoxidil (ROGAINE WOMENS) 5 % FOAM Massage into scalp

## 2025-06-30 ENCOUNTER — TELEPHONE (OUTPATIENT)
Dept: FAMILY MEDICINE CLINIC | Facility: CLINIC | Age: 55
End: 2025-06-30

## 2025-06-30 DIAGNOSIS — R59.1 LYMPHADENOPATHY: Primary | ICD-10-CM

## 2025-07-11 ENCOUNTER — HOSPITAL ENCOUNTER (OUTPATIENT)
Dept: CT IMAGING | Age: 55
Discharge: HOME | End: 2025-07-13
Attending: FAMILY MEDICINE
Payer: MEDICARE

## 2025-07-11 DIAGNOSIS — R59.1 LYMPHADENOPATHY: ICD-10-CM

## 2025-07-11 PROCEDURE — 70491 CT SOFT TISSUE NECK W/DYE: CPT

## 2025-07-11 PROCEDURE — 6360000004 HC RX CONTRAST MEDICATION: Performed by: FAMILY MEDICINE

## 2025-07-11 RX ORDER — IOPAMIDOL 755 MG/ML
80 INJECTION, SOLUTION INTRAVASCULAR
Status: COMPLETED | OUTPATIENT
Start: 2025-07-11 | End: 2025-07-11

## 2025-07-11 RX ADMIN — IOPAMIDOL 80 ML: 755 INJECTION, SOLUTION INTRAVENOUS at 14:43

## 2025-07-17 ENCOUNTER — TELEMEDICINE (OUTPATIENT)
Dept: FAMILY MEDICINE CLINIC | Facility: CLINIC | Age: 55
End: 2025-07-17
Payer: MEDICARE

## 2025-07-17 DIAGNOSIS — R59.1 LYMPHADENOPATHY: Primary | ICD-10-CM

## 2025-07-17 PROCEDURE — 99213 OFFICE O/P EST LOW 20 MIN: CPT | Performed by: FAMILY MEDICINE

## 2025-07-17 ASSESSMENT — ENCOUNTER SYMPTOMS
VOMITING: 0
SHORTNESS OF BREATH: 0
NAUSEA: 0

## 2025-07-17 NOTE — PROGRESS NOTES
PROGRESS NOTE    SUBJECTIVE:   Emily Wallis is a 54 y.o. female seen for a follow up visit regarding the following chief complaint:     Chief Complaint   Patient presents with    Follow-up           HPI patient is doing a phone call visit to go over her CT scan secondary to workup of lymphadenopathy      Past Medical History, Past Surgical History, Family history, Social History, and Medications were all reviewed with the patient today and updated as necessary.       Current Outpatient Medications   Medication Sig Dispense Refill    naproxen (NAPROSYN) 500 MG tablet Take 1 tablet by mouth 2 times daily (with meals) 60 tablet 0    albuterol sulfate HFA (PROVENTIL;VENTOLIN;PROAIR) 108 (90 Base) MCG/ACT inhaler Inhale 2 puffs into the lungs every 6 hours as needed for Wheezing or Shortness of Breath 8.5 each 5    fluticasone (FLONASE) 50 MCG/ACT nasal spray SPRAY 1 SPRAY BY NASAL ROUTE IN THE MORNING 1 each 5    Minoxidil (ROGAINE WOMENS) 5 % FOAM Massage into scalp after shower then set dry daily 60 g 5    montelukast (SINGULAIR) 10 MG tablet Take 1 tablet by mouth nightly TAKE 1 TABLET BY MOUTH EVERY DAY 90 tablet 3    omeprazole (PRILOSEC) 40 MG delayed release capsule Take 1 capsule by mouth daily 90 capsule 3    rosuvastatin (CRESTOR) 20 MG tablet Take 1 tablet by mouth every morning 90 tablet 3    selenium sulfide (SELSUN BLUE) 1 % shampoo Apply topically daily as needed for Itching 118 mL 1    sertraline (ZOLOFT) 100 MG tablet Start with half a pill for 8 days then start 1 pill p.o. daily 90 tablet 3    valACYclovir (VALTREX) 1 g tablet Take 2 tablets by mouth 2 times daily 6 tablet 5    vitamin D (ERGOCALCIFEROL) 1.25 MG (75444 UT) CAPS capsule Take 1 capsule by mouth Once a week at 5 PM 4 capsule 5    estradiol (ESTRACE) 2 MG tablet Take 1 tablet by mouth daily 90 tablet 3    medroxyPROGESTERone (PROVERA) 5 MG tablet Take 1 tablet by mouth daily 93 tablet 3    Coenzyme Q10 10 MG CAPS 1 p.o. daily for

## (undated) DEVICE — NDL PRT INJ NSAF BLNT 18GX1.5 --

## (undated) DEVICE — CONTAINER PREFIL FRMLN 40ML --

## (undated) DEVICE — KENDALL RADIOLUCENT FOAM MONITORING ELECTRODE RECTANGULAR SHAPE: Brand: KENDALL

## (undated) DEVICE — REM POLYHESIVE ADULT PATIENT RETURN ELECTRODE: Brand: VALLEYLAB

## (undated) DEVICE — CANNULA NSL ORAL AD FOR CAPNOFLEX CO2 O2 AIRLFE

## (undated) DEVICE — SYR 5ML 1/5 GRAD LL NSAF LF --

## (undated) DEVICE — SYR 3ML LL TIP 1/10ML GRAD --

## (undated) DEVICE — FCPS BX HOT RJ4 2.2MMX240CM -- RADIAL JAW 4 BX/40

## (undated) DEVICE — CONNECTOR TBNG OD5-7MM O2 END DISP